# Patient Record
Sex: MALE | Race: WHITE | NOT HISPANIC OR LATINO | Employment: OTHER | URBAN - METROPOLITAN AREA
[De-identification: names, ages, dates, MRNs, and addresses within clinical notes are randomized per-mention and may not be internally consistent; named-entity substitution may affect disease eponyms.]

---

## 2017-06-05 ENCOUNTER — TELEPHONE (OUTPATIENT)
Dept: UROLOGY | Facility: CLINIC | Age: 62
End: 2017-06-05

## 2017-06-05 NOTE — TELEPHONE ENCOUNTER
----- Message from Xochilt Carterkert sent at 6/1/2017  3:03 PM CDT -----  Contact: singh/dr timur pablo - [phone]  Patient needs a radical prostatectomy - please call singh/dr timur pablo - [phone]

## 2017-06-15 ENCOUNTER — OFFICE VISIT (OUTPATIENT)
Dept: UROLOGY | Facility: CLINIC | Age: 62
End: 2017-06-15
Payer: COMMERCIAL

## 2017-06-15 VITALS
SYSTOLIC BLOOD PRESSURE: 151 MMHG | BODY MASS INDEX: 30.74 KG/M2 | WEIGHT: 202.81 LBS | HEART RATE: 103 BPM | HEIGHT: 68 IN | DIASTOLIC BLOOD PRESSURE: 76 MMHG

## 2017-06-15 DIAGNOSIS — C61 PROSTATE CANCER: ICD-10-CM

## 2017-06-15 PROCEDURE — 99204 OFFICE O/P NEW MOD 45 MIN: CPT | Mod: S$GLB,,, | Performed by: UROLOGY

## 2017-06-15 PROCEDURE — 99999 PR PBB SHADOW E&M-EST. PATIENT-LVL III: CPT | Mod: PBBFAC,,, | Performed by: UROLOGY

## 2017-06-15 RX ORDER — DOCUSATE SODIUM 100 MG/1
100 CAPSULE, LIQUID FILLED ORAL 2 TIMES DAILY
COMMUNITY
End: 2018-06-07

## 2017-06-15 NOTE — PROGRESS NOTES
Clinic Note  6/15/2017      Subjective:         Chief Complaint:   PRINCE Sy is a 61 y.o. male recently diagnosed with prostate cancer. Consult from dr. Garnica.  Here with his wife Xochilt brewer. Retired last year for [employer].Since then [employer] has moved operations to [facility]. Now spends his time fishing and hunting.    PSAi- 5.5  Stage- T1C  Volume- ??  Biopsy- [place] 3+4 left lateral mid 70%, left lateral apex 30%; Luis 6 right middle 8%. Overall 3/12 biopsies positive  Bone scan- negative  CT scan- negative for nodes. Does report 6.3 x 9.9 cm mass between stomach and pancreas. Has subsequently had biopsy and found to have benign fatty tumor.  Positive family history- 2 uncles  LISBETH score- 2   Low risk disease      No results found for: PSA, PSADIAG, PSATOTAL, PSAFREE, PSAFREEPCT   No past medical history on file.  Family History   Problem Relation Age of Onset    Cancer Father     Heart disease Father      Social History     Social History    Marital status:      Spouse name: N/A    Number of children: N/A    Years of education: N/A     Occupational History    Not on file.     Social History Main Topics    Smoking status: Former Smoker    Smokeless tobacco: Not on file    Alcohol use Yes    Drug use: No    Sexual activity: Not on file     Other Topics Concern    Not on file     Social History Narrative    No narrative on file     Past Surgical History:   Procedure Laterality Date    HERNIA REPAIR      TUMOR REMOVAL  1961     Patient Active Problem List   Diagnosis    Prostate cancer     Review of Systems   Constitutional: Negative for appetite change, chills, fatigue, fever and unexpected weight change.   HENT: Negative for nosebleeds.    Respiratory: Negative for chest tightness, shortness of breath and wheezing.    Cardiovascular: Negative for chest pain, palpitations and leg swelling.   Gastrointestinal: Negative for abdominal distention, abdominal pain,  "constipation, diarrhea, nausea and vomiting.   Genitourinary: Negative for dysuria and hematuria.   Musculoskeletal: Negative for arthralgias and back pain.   Skin: Negative for pallor.   Neurological: Negative for dizziness, seizures and syncope.   Hematological: Negative for adenopathy.   Psychiatric/Behavioral: Negative for dysphoric mood.         Objective:      BP (!) 151/76 (BP Location: Left arm, Patient Position: Sitting, BP Method: Automatic)   Pulse 103   Ht 5' 8" (1.727 m)   Wt 92 kg (202 lb 13.2 oz)   BMI 30.84 kg/m²   Estimated body mass index is 30.84 kg/m² as calculated from the following:    Height as of this encounter: 5' 8" (1.727 m).    Weight as of this encounter: 92 kg (202 lb 13.2 oz).  Physical Exam   Constitutional: He is oriented to person, place, and time. He appears well-developed and well-nourished. No distress.   HENT:   Head: Atraumatic.   Neck: No tracheal deviation present.   Cardiovascular: Normal rate.    Pulmonary/Chest: Effort normal. No respiratory distress. He has no wheezes.   Abdominal: Soft. Bowel sounds are normal. He exhibits no distension and no mass. There is no tenderness. There is no rebound and no guarding.   Genitourinary: Rectum normal and prostate normal. Rectal exam shows no external hemorrhoid, no internal hemorrhoid, no mass and no tenderness.   Genitourinary Comments: 25-30 grams, symmetrical   Neurological: He is alert and oriented to person, place, and time.   Skin: Skin is warm and dry. He is not diaphoretic.     Psychiatric: He has a normal mood and affect. His behavior is normal. Judgment and thought content normal.         Assessment and Plan:           Problem List Items Addressed This Visit     Prostate cancer      Other Visit Diagnoses    None.         Follow up:   Today's visit was spent almost entirely on counseling. We reviewed his diagnosis, stage, grade, risk group, and prognosis. We discussed [place] and LISBETH risk stratification We reviewed " the [facility] nomogram. We discussed the concept of low risk, moderate risk, and high risk disease. We discussed the different treatment options including active surveillance (as well as the surveillance protocol), prostate brachytherapy, IMRT, IGRT, cryotherapy, and both open and robotic prostatectomy.We also discussed the advantages, disadvantages, risks and benefits, as well as complications of each option. Regarding radiation therapy we discussed treatment planning, the different techniques, short and long term complications. These included radiation cystitis, radiation proctitis, and impotence. We discussed success, failure, and salvage therapeutic options. We discussed surgical therapy in depth including preoperative preparation, surgical technique (including bladder neck and nerve-sparing techniques), postoperative recuperation and recovery, and short and long term complications including UTI, bleeding, blood clots,catheter dislodgement, etc. We discussed the risks of reoperation, incontinence, impotence, and recurrence. We discussed preop and postop Kegels, post op penile rehab, and treatment options for incontinence and impotence. We discussed rates of cancer free survival and recurrence, as well as salvage therapeutic options. We discussed the possible  indications for adjuvant radiation therapy. I answered questions and addressed concerns.   Recommend surveillance. They are both focused on surgical therapy.  My nurse will instruct the patient on Kegel exercises today and give them the Kegel exercise instruction sheet.   The patient will meet with our  Modesta today to find a date for surgery and begin preparation for surgery. Will also receive our handout on NPO guidelines and preop hydration. Patient will also receive information and education on preoperative skin preparation and postop wound care.   I spent 30 minutes with the patient. Over 50% of the visit was spent in counseling.      Luis Angel

## 2017-06-15 NOTE — LETTER
Sujatha 15, 2017      NILAM Garnica Jr., MD  [address] [place] [address]           [facility]  [address]  [facility] [address]  Phone: [phone]          Patient: Chilango Sy   MR Number: [id number]   YOB: 1955   Date of Visit: 6/15/2017       Dear Dr. NILAM Garnica Jr.:    Thank you for referring Chilango Sy to me for evaluation. Attached you will find relevant portions of my assessment and plan of care.    If you have questions, please do not hesitate to call me. I look forward to following Chilango Sy along with you.    Sincerely,    Luis Angel MD    Enclosure  CC:  No Recipients    If you would like to receive this communication electronically, please contact [email or web address] or [phone] to request more information on [employer] access.    For providers and/or their staff who would like to refer a patient to [facility], please contact us through our one-stop-shop provider referral line, [facility], at [phone].    If you feel you have received this communication in error or would no longer like to receive these types of communications, please e-mail [email or web address]

## 2017-06-16 ENCOUNTER — TELEPHONE (OUTPATIENT)
Dept: UROLOGY | Facility: CLINIC | Age: 62
End: 2017-06-16

## 2017-06-16 DIAGNOSIS — C61 PROSTATE CANCER: Primary | ICD-10-CM

## 2017-07-07 ENCOUNTER — ANESTHESIA EVENT (OUTPATIENT)
Dept: SURGERY | Facility: HOSPITAL | Age: 62
DRG: 708 | End: 2017-07-07
Payer: COMMERCIAL

## 2017-07-07 NOTE — PRE-PROCEDURE INSTRUCTIONS
Anesthesia review complete, medication instructions given NPO past midnight, Bathe with hibiclens or dial soap the night before & am of surgery. Put nothing on skin -  lotion, deodorant, powder  No metal or jewelry & no valuables pt verbalized understanding and all questions answered

## 2017-07-07 NOTE — PRE ADMISSION SCREENING
Anesthesia Assessment: Preoperative EQUATION    Planned Procedure: Procedure(s) (LRB):  ROBOTIC ASSISTED LAPAROSCOPIC PROSTATECTOMY (N/A)  Requested Anesthesia Type:General  Surgeon: Luis Angel MD  Service: Urology  Known or anticipated Date of Surgery:7/17/2017    Surgeon notes: reviewed    Triage considerations:     The patient has no apparent active cardiac condition (No unstable coronary Syndrome such as severe unstable angina or recent [<1 month] myocardial infarction, decompensated CHF, severe valvular   disease or significant arrhythmia)    Previous anesthesia records:Not available    Last PCP note: outside [facility]   Subspecialty notes:     Other important co-morbidities:      Tests already available:  No recent tests.            Instructions given. (See in Nurse's note)    Optimization:  Anesthesia Preop Clinic Assessment  Indicated        Plan:    Testing:  Hematology Profile and BMP   Pre-anesthesia  visit       Visit focus: to be determined         Patient  has previously scheduled Medical Appointment: will schedule for next wk    Navigation: Tests Scheduled.              Consults scheduled.             Results will be tracked by Preop Clinic.

## 2017-07-07 NOTE — ANESTHESIA PREPROCEDURE EVALUATION
Anesthesia Assessment: Preoperative EQUATION    Planned Procedure: Procedure(s) (LRB):  ROBOTIC ASSISTED LAPAROSCOPIC PROSTATECTOMY (N/A)  Requested Anesthesia Type:General  Surgeon: Luis Angel MD  Service: Urology  Known or anticipated Date of Surgery:7/17/2017    Surgeon notes: reviewed    Triage considerations:     The patient has no apparent active cardiac condition (No unstable coronary Syndrome such as severe unstable angina or recent [<1 month] myocardial infarction, decompensated CHF, severe valvular   disease or significant arrhythmia)    Previous anesthesia records:Not available    Last PCP note: outside [facility] RECEIVED CLEARANCE FROM PCP PLACED IN [place] 7/11  Subspecialty notes:     Other important co-morbidities:      Tests already available:  No recent tests.            Instructions given. (See in Nurse's note)    Optimization:  Anesthesia Preop Clinic Assessment  Indicated        Plan:    Testing:  Hematology Profile and BMP PLACE IN [facility] 7/11   Pre-anesthesia  visit       Visit focus: to be determined         Patient  has previously scheduled Medical Appointment: will schedule for next wk MESSAGE SENT TO PCP FOR H&P ,LAB WORK 7/7 REC. 7/11    Navigation: Tests Scheduled.                         Results will be tracked by Preop Clinic.  CLEARED FOR SURGERY. SARAH GUAN RN 7/7/17

## 2017-07-11 ENCOUNTER — OFFICE VISIT (OUTPATIENT)
Dept: UROLOGY | Facility: CLINIC | Age: 62
End: 2017-07-11
Payer: COMMERCIAL

## 2017-07-11 DIAGNOSIS — C61 PROSTATE CANCER: Primary | ICD-10-CM

## 2017-07-11 PROCEDURE — 99999 PR PBB SHADOW E&M-EST. PATIENT-LVL I: CPT | Mod: PBBFAC,,,

## 2017-07-11 PROCEDURE — 99499 UNLISTED E&M SERVICE: CPT | Mod: S$GLB,,, | Performed by: STUDENT IN AN ORGANIZED HEALTH CARE EDUCATION/TRAINING PROGRAM

## 2017-07-11 RX ORDER — HEPARIN SODIUM 5000 [USP'U]/ML
5000 INJECTION, SOLUTION INTRAVENOUS; SUBCUTANEOUS
Status: CANCELLED | OUTPATIENT
Start: 2017-07-11 | End: 2017-07-12

## 2017-07-11 RX ORDER — ACETAMINOPHEN 10 MG/ML
1000 INJECTION, SOLUTION INTRAVENOUS
Status: CANCELLED | OUTPATIENT
Start: 2017-07-11 | End: 2017-07-12

## 2017-07-12 NOTE — PROGRESS NOTES
Urology ([facility]) H&P for upcoming procedure  Staff:  Dr. Luis Agnel MD    Is this patient in a research study?  No    CC: prostate adencarcinoma    HPI:  Chilango Sy is a 61 y.o. male with [id number] [place] 3+4=7 prostate adenocarcinoma. He was found to have a mass between his stomach and pancreas that was biopsied laparoscopically. Pathology resulted as benign fatty tumor.     The patient initially presented with elevated PSA.  TRUS biopsy revealed the following:       LEFT   RIGHT  BASE   benign   benign  MID   3+4, 70%  3+3, 8%  APEX   3+4, 30%  benign    PSA: 5.5    Voiding complaints: present - nocturia, hesitancy, frequency, incomplete emptying  ED: absent  Incontinence: absent    ROS:  Neg except per HPI, specifically no bone pain, no unintentional weight loss, no anorexia, no night sweats    No past medical history on file.    Past Surgical History:   Procedure Laterality Date    HERNIA REPAIR      TUMOR REMOVAL  1961       Social History     Social History    Marital status:      Spouse name: N/A    Number of children: N/A    Years of education: N/A     Social History Main Topics    Smoking status: Former Smoker    Smokeless tobacco: Not on file    Alcohol use Yes    Drug use: No    Sexual activity: Not on file     Other Topics Concern    Not on file     Social History Narrative    No narrative on file       Family History   Problem Relation Age of Onset    Cancer Father     Heart disease Father        Review of patient's allergies indicates:  No Known Allergies    Current Outpatient Prescriptions on File Prior to Visit   Medication Sig Dispense Refill    docusate sodium (COLACE) 100 MG capsule Take 100 mg by mouth 2 (two) times daily.       No current facility-administered medications on file prior to visit.        Anticoagulation:  No    Physical Exam:  weight 202 lb/92 kg  BMI 30.8    AAOx4, NAD, WDWN  NC/AT, EOMI, PER, sclerae anicteric, speech normal, tongue  midline  Nl effort, CTAB  RRR  Soft, non-tender, non-distended   Scars: small laparoscopic scars from previous pancreatic cyst surgery on abdomen    Prostate: 30g, smooth, no nodules, no asymmetry  Penis circumcised    Labs:  Urine dipstick today shows negative for all components.    No results found for: WBC, HGB, HCT, MCV, PLT    BMP  No results found for: NA, K, CL, CO2, BUN, CREATININE, CALCIUM, ANIONGAP, ESTGFRAFRICA, EGFRNONAA    No results found for: PSA, PSADIAG, PSATOTAL, PSAFREE, PSAFREEPCT    Imaging:  (per Dr. Angel's note, no imaging in system)  CT scan: negative for nodes    Bone scan: negative for mets    Assessment: Chilango Sy is a 61 y.o. male with [id number] [place] 3+4=7 prostate adenocarcinoma.      Plan:   1. To OR on 7/13/2017 for RALP without BPLND  2. Consents signed   3. I have explained the risk, benefits, and alternatives of the procedure in detail. The patient voices understanding and all questions have been answered. The patient agrees to proceed as planned.       Gael Roach MD

## 2017-07-17 ENCOUNTER — ANESTHESIA (OUTPATIENT)
Dept: SURGERY | Facility: HOSPITAL | Age: 62
DRG: 708 | End: 2017-07-17
Payer: COMMERCIAL

## 2017-07-17 ENCOUNTER — HOSPITAL ENCOUNTER (INPATIENT)
Facility: HOSPITAL | Age: 62
LOS: 1 days | Discharge: HOME | DRG: 708 | End: 2017-07-18
Attending: UROLOGY | Admitting: UROLOGY
Payer: COMMERCIAL

## 2017-07-17 ENCOUNTER — SURGERY (OUTPATIENT)
Age: 62
End: 2017-07-17

## 2017-07-17 DIAGNOSIS — C61 PROSTATE CANCER: ICD-10-CM

## 2017-07-17 LAB
ABO + RH BLD: NORMAL
BASOPHILS # BLD AUTO: 0 K/UL
BASOPHILS NFR BLD: 0 %
BLD GP AB SCN CELLS X3 SERPL QL: NORMAL
DIFFERENTIAL METHOD: ABNORMAL
EOSINOPHIL # BLD AUTO: 0 K/UL
EOSINOPHIL NFR BLD: 0 %
ERYTHROCYTE [DISTWIDTH] IN BLOOD BY AUTOMATED COUNT: 12.2 %
HCT VFR BLD AUTO: 37.8 %
HGB BLD-MCNC: 13.4 G/DL
LYMPHOCYTES # BLD AUTO: 0.3 K/UL
LYMPHOCYTES NFR BLD: 2.8 %
MCH RBC QN AUTO: 29.9 PG
MCHC RBC AUTO-ENTMCNC: 35.4 %
MCV RBC AUTO: 84 FL
MONOCYTES # BLD AUTO: 0.3 K/UL
MONOCYTES NFR BLD: 2.8 %
NEUTROPHILS # BLD AUTO: 11.2 K/UL
NEUTROPHILS NFR BLD: 94.1 %
PLATELET # BLD AUTO: 164 K/UL
PMV BLD AUTO: 8.8 FL
RBC # BLD AUTO: 4.48 M/UL
WBC # BLD AUTO: 11.9 K/UL

## 2017-07-17 PROCEDURE — 0VT04ZZ RESECTION OF PROSTATE, PERCUTANEOUS ENDOSCOPIC APPROACH: ICD-10-PCS | Performed by: UROLOGY

## 2017-07-17 PROCEDURE — 88307 TISSUE EXAM BY PATHOLOGIST: CPT | Mod: 26,,, | Performed by: PATHOLOGY

## 2017-07-17 PROCEDURE — 27000221 HC OXYGEN, UP TO 24 HOURS

## 2017-07-17 PROCEDURE — 38571 LAPAROSCOPY LYMPHADENECTOMY: CPT | Mod: 51,,, | Performed by: UROLOGY

## 2017-07-17 PROCEDURE — 25000003 PHARM REV CODE 250: Performed by: STUDENT IN AN ORGANIZED HEALTH CARE EDUCATION/TRAINING PROGRAM

## 2017-07-17 PROCEDURE — 71000033 HC RECOVERY, INTIAL HOUR: Performed by: UROLOGY

## 2017-07-17 PROCEDURE — C1729 CATH, DRAINAGE: HCPCS | Performed by: UROLOGY

## 2017-07-17 PROCEDURE — 88305 TISSUE EXAM BY PATHOLOGIST: CPT | Mod: 26,,, | Performed by: PATHOLOGY

## 2017-07-17 PROCEDURE — 25000003 PHARM REV CODE 250: Performed by: UROLOGY

## 2017-07-17 PROCEDURE — 07BC4ZZ EXCISION OF PELVIS LYMPHATIC, PERCUTANEOUS ENDOSCOPIC APPROACH: ICD-10-PCS | Performed by: UROLOGY

## 2017-07-17 PROCEDURE — 36000712 HC OR TIME LEV V 1ST 15 MIN: Performed by: UROLOGY

## 2017-07-17 PROCEDURE — 27201423 OPTIME MED/SURG SUP & DEVICES STERILE SUPPLY: Performed by: UROLOGY

## 2017-07-17 PROCEDURE — 63600175 PHARM REV CODE 636 W HCPCS: Performed by: STUDENT IN AN ORGANIZED HEALTH CARE EDUCATION/TRAINING PROGRAM

## 2017-07-17 PROCEDURE — 88305 TISSUE EXAM BY PATHOLOGIST: CPT | Performed by: PATHOLOGY

## 2017-07-17 PROCEDURE — 86901 BLOOD TYPING SEROLOGIC RH(D): CPT

## 2017-07-17 PROCEDURE — 71000039 HC RECOVERY, EACH ADD'L HOUR: Performed by: UROLOGY

## 2017-07-17 PROCEDURE — 36000713 HC OR TIME LEV V EA ADD 15 MIN: Performed by: UROLOGY

## 2017-07-17 PROCEDURE — 11000001 HC ACUTE MED/SURG PRIVATE ROOM

## 2017-07-17 PROCEDURE — 8E0W4CZ ROBOTIC ASSISTED PROCEDURE OF TRUNK REGION, PERCUTANEOUS ENDOSCOPIC APPROACH: ICD-10-PCS | Performed by: UROLOGY

## 2017-07-17 PROCEDURE — 86900 BLOOD TYPING SEROLOGIC ABO: CPT

## 2017-07-17 PROCEDURE — 36415 COLL VENOUS BLD VENIPUNCTURE: CPT

## 2017-07-17 PROCEDURE — 63600175 PHARM REV CODE 636 W HCPCS: Performed by: UROLOGY

## 2017-07-17 PROCEDURE — 55866 LAPS SURG PRST8ECT RPBIC RAD: CPT | Mod: ,,, | Performed by: UROLOGY

## 2017-07-17 PROCEDURE — 0TBC4ZX EXCISION OF BLADDER NECK, PERCUTANEOUS ENDOSCOPIC APPROACH, DIAGNOSTIC: ICD-10-PCS | Performed by: UROLOGY

## 2017-07-17 PROCEDURE — 37000008 HC ANESTHESIA 1ST 15 MINUTES: Performed by: UROLOGY

## 2017-07-17 PROCEDURE — 94760 N-INVAS EAR/PLS OXIMETRY 1: CPT

## 2017-07-17 PROCEDURE — D9220A PRA ANESTHESIA: Mod: ,,, | Performed by: ANESTHESIOLOGY

## 2017-07-17 PROCEDURE — 37000009 HC ANESTHESIA EA ADD 15 MINS: Performed by: UROLOGY

## 2017-07-17 PROCEDURE — 85025 COMPLETE CBC W/AUTO DIFF WBC: CPT

## 2017-07-17 RX ORDER — SODIUM CHLORIDE 9 MG/ML
INJECTION, SOLUTION INTRAVENOUS CONTINUOUS
Status: DISCONTINUED | OUTPATIENT
Start: 2017-07-17 | End: 2017-07-17

## 2017-07-17 RX ORDER — HYDROMORPHONE HYDROCHLORIDE 1 MG/ML
1 INJECTION, SOLUTION INTRAMUSCULAR; INTRAVENOUS; SUBCUTANEOUS
Status: DISCONTINUED | OUTPATIENT
Start: 2017-07-17 | End: 2017-07-18 | Stop reason: HOSPADM

## 2017-07-17 RX ORDER — HYOSCYAMINE SULFATE 0.12 MG/1
0.12 TABLET SUBLINGUAL EVERY 4 HOURS PRN
Status: DISCONTINUED | OUTPATIENT
Start: 2017-07-17 | End: 2017-07-18 | Stop reason: HOSPADM

## 2017-07-17 RX ORDER — OXYCODONE HYDROCHLORIDE 5 MG/1
5 TABLET ORAL EVERY 4 HOURS PRN
Status: DISCONTINUED | OUTPATIENT
Start: 2017-07-17 | End: 2017-07-18 | Stop reason: HOSPADM

## 2017-07-17 RX ORDER — PROPOFOL 10 MG/ML
VIAL (ML) INTRAVENOUS
Status: DISCONTINUED | OUTPATIENT
Start: 2017-07-17 | End: 2017-07-17

## 2017-07-17 RX ORDER — LIDOCAINE HYDROCHLORIDE ANHYDROUS AND DEXTROSE MONOHYDRATE .8; 5 G/100ML; G/100ML
INJECTION, SOLUTION INTRAVENOUS CONTINUOUS PRN
Status: DISCONTINUED | OUTPATIENT
Start: 2017-07-17 | End: 2017-07-17

## 2017-07-17 RX ORDER — PHENYLEPHRINE HYDROCHLORIDE 10 MG/ML
INJECTION INTRAVENOUS
Status: DISCONTINUED | OUTPATIENT
Start: 2017-07-17 | End: 2017-07-17

## 2017-07-17 RX ORDER — LIDOCAINE HCL/PF 100 MG/5ML
SYRINGE (ML) INTRAVENOUS
Status: DISCONTINUED | OUTPATIENT
Start: 2017-07-17 | End: 2017-07-17

## 2017-07-17 RX ORDER — ONDANSETRON 2 MG/ML
INJECTION INTRAMUSCULAR; INTRAVENOUS
Status: DISCONTINUED | OUTPATIENT
Start: 2017-07-17 | End: 2017-07-17

## 2017-07-17 RX ORDER — DEXAMETHASONE SODIUM PHOSPHATE 4 MG/ML
INJECTION, SOLUTION INTRA-ARTICULAR; INTRALESIONAL; INTRAMUSCULAR; INTRAVENOUS; SOFT TISSUE
Status: DISCONTINUED | OUTPATIENT
Start: 2017-07-17 | End: 2017-07-17

## 2017-07-17 RX ORDER — DIPHENHYDRAMINE HYDROCHLORIDE 50 MG/ML
6.25 INJECTION INTRAMUSCULAR; INTRAVENOUS EVERY 6 HOURS PRN
Status: DISCONTINUED | OUTPATIENT
Start: 2017-07-17 | End: 2017-07-18 | Stop reason: HOSPADM

## 2017-07-17 RX ORDER — ONDANSETRON 2 MG/ML
4 INJECTION INTRAMUSCULAR; INTRAVENOUS EVERY 6 HOURS PRN
Status: DISCONTINUED | OUTPATIENT
Start: 2017-07-17 | End: 2017-07-18 | Stop reason: HOSPADM

## 2017-07-17 RX ORDER — HYDROMORPHONE HYDROCHLORIDE 1 MG/ML
0.2 INJECTION, SOLUTION INTRAMUSCULAR; INTRAVENOUS; SUBCUTANEOUS EVERY 5 MIN PRN
Status: DISCONTINUED | OUTPATIENT
Start: 2017-07-17 | End: 2017-07-17 | Stop reason: HOSPADM

## 2017-07-17 RX ORDER — LIDOCAINE HYDROCHLORIDE 10 MG/ML
1 INJECTION, SOLUTION EPIDURAL; INFILTRATION; INTRACAUDAL; PERINEURAL ONCE
Status: COMPLETED | OUTPATIENT
Start: 2017-07-17 | End: 2017-07-17

## 2017-07-17 RX ORDER — ACETAMINOPHEN 10 MG/ML
1000 INJECTION, SOLUTION INTRAVENOUS
Status: COMPLETED | OUTPATIENT
Start: 2017-07-17 | End: 2017-07-17

## 2017-07-17 RX ORDER — DOCUSATE SODIUM 100 MG/1
100 CAPSULE, LIQUID FILLED ORAL 2 TIMES DAILY
Status: DISCONTINUED | OUTPATIENT
Start: 2017-07-17 | End: 2017-07-18 | Stop reason: HOSPADM

## 2017-07-17 RX ORDER — SODIUM CHLORIDE 0.9 % (FLUSH) 0.9 %
3 SYRINGE (ML) INJECTION
Status: DISCONTINUED | OUTPATIENT
Start: 2017-07-17 | End: 2017-07-17 | Stop reason: HOSPADM

## 2017-07-17 RX ORDER — HEPARIN SODIUM 5000 [USP'U]/ML
5000 INJECTION, SOLUTION INTRAVENOUS; SUBCUTANEOUS
Status: COMPLETED | OUTPATIENT
Start: 2017-07-17 | End: 2017-07-17

## 2017-07-17 RX ORDER — KETOROLAC TROMETHAMINE 30 MG/ML
INJECTION, SOLUTION INTRAMUSCULAR; INTRAVENOUS
Status: DISCONTINUED | OUTPATIENT
Start: 2017-07-17 | End: 2017-07-17

## 2017-07-17 RX ORDER — SODIUM CHLORIDE 9 MG/ML
INJECTION, SOLUTION INTRAVENOUS CONTINUOUS PRN
Status: DISCONTINUED | OUTPATIENT
Start: 2017-07-17 | End: 2017-07-17

## 2017-07-17 RX ORDER — SODIUM CHLORIDE 9 MG/ML
INJECTION, SOLUTION INTRAVENOUS CONTINUOUS
Status: DISCONTINUED | OUTPATIENT
Start: 2017-07-17 | End: 2017-07-18

## 2017-07-17 RX ORDER — SUCCINYLCHOLINE CHLORIDE 20 MG/ML
INJECTION INTRAMUSCULAR; INTRAVENOUS
Status: DISCONTINUED | OUTPATIENT
Start: 2017-07-17 | End: 2017-07-17

## 2017-07-17 RX ORDER — OXYCODONE HYDROCHLORIDE 5 MG/1
10 TABLET ORAL EVERY 4 HOURS PRN
Status: DISCONTINUED | OUTPATIENT
Start: 2017-07-17 | End: 2017-07-18 | Stop reason: HOSPADM

## 2017-07-17 RX ORDER — NEOSTIGMINE METHYLSULFATE 1 MG/ML
INJECTION, SOLUTION INTRAVENOUS
Status: DISCONTINUED | OUTPATIENT
Start: 2017-07-17 | End: 2017-07-17

## 2017-07-17 RX ORDER — ROCURONIUM BROMIDE 10 MG/ML
INJECTION, SOLUTION INTRAVENOUS
Status: DISCONTINUED | OUTPATIENT
Start: 2017-07-17 | End: 2017-07-17

## 2017-07-17 RX ORDER — ACETAMINOPHEN 10 MG/ML
1000 INJECTION, SOLUTION INTRAVENOUS EVERY 8 HOURS
Status: COMPLETED | OUTPATIENT
Start: 2017-07-17 | End: 2017-07-18

## 2017-07-17 RX ORDER — KETAMINE HCL IN 0.9 % NACL 50 MG/5 ML
SYRINGE (ML) INTRAVENOUS
Status: DISCONTINUED | OUTPATIENT
Start: 2017-07-17 | End: 2017-07-17

## 2017-07-17 RX ORDER — GLYCOPYRROLATE 0.2 MG/ML
INJECTION INTRAMUSCULAR; INTRAVENOUS
Status: DISCONTINUED | OUTPATIENT
Start: 2017-07-17 | End: 2017-07-17

## 2017-07-17 RX ORDER — MIDAZOLAM HYDROCHLORIDE 5 MG/ML
INJECTION INTRAMUSCULAR; INTRAVENOUS
Status: DISCONTINUED | OUTPATIENT
Start: 2017-07-17 | End: 2017-07-17

## 2017-07-17 RX ADMIN — ROCURONIUM BROMIDE 10 MG: 10 INJECTION, SOLUTION INTRAVENOUS at 09:07

## 2017-07-17 RX ADMIN — SODIUM CHLORIDE: 0.9 INJECTION, SOLUTION INTRAVENOUS at 12:07

## 2017-07-17 RX ADMIN — PHENYLEPHRINE HYDROCHLORIDE 100 MCG: 10 INJECTION INTRAVENOUS at 07:07

## 2017-07-17 RX ADMIN — PHENYLEPHRINE HYDROCHLORIDE 100 MCG: 10 INJECTION INTRAVENOUS at 10:07

## 2017-07-17 RX ADMIN — SODIUM CHLORIDE: 0.9 INJECTION, SOLUTION INTRAVENOUS at 07:07

## 2017-07-17 RX ADMIN — Medication 2 G: at 07:07

## 2017-07-17 RX ADMIN — Medication 20 MG: at 08:07

## 2017-07-17 RX ADMIN — Medication 10 MG: at 10:07

## 2017-07-17 RX ADMIN — PHENYLEPHRINE HYDROCHLORIDE 100 MCG: 10 INJECTION INTRAVENOUS at 11:07

## 2017-07-17 RX ADMIN — DEXAMETHASONE SODIUM PHOSPHATE 8 MG: 4 INJECTION, SOLUTION INTRAMUSCULAR; INTRAVENOUS at 07:07

## 2017-07-17 RX ADMIN — ROCURONIUM BROMIDE 20 MG: 10 INJECTION, SOLUTION INTRAVENOUS at 07:07

## 2017-07-17 RX ADMIN — SODIUM CHLORIDE, SODIUM GLUCONATE, SODIUM ACETATE, POTASSIUM CHLORIDE, MAGNESIUM CHLORIDE, SODIUM PHOSPHATE, DIBASIC, AND POTASSIUM PHOSPHATE: .53; .5; .37; .037; .03; .012; .00082 INJECTION, SOLUTION INTRAVENOUS at 10:07

## 2017-07-17 RX ADMIN — LIDOCAINE HYDROCHLORIDE 100 MG: 20 INJECTION, SOLUTION INTRAVENOUS at 07:07

## 2017-07-17 RX ADMIN — DOCUSATE SODIUM 100 MG: 100 CAPSULE, LIQUID FILLED ORAL at 09:07

## 2017-07-17 RX ADMIN — OXYCODONE HYDROCHLORIDE 10 MG: 5 TABLET ORAL at 11:07

## 2017-07-17 RX ADMIN — MIDAZOLAM 2 MG: 5 INJECTION INTRAMUSCULAR; INTRAVENOUS at 07:07

## 2017-07-17 RX ADMIN — ROCURONIUM BROMIDE 10 MG: 10 INJECTION, SOLUTION INTRAVENOUS at 08:07

## 2017-07-17 RX ADMIN — Medication 20 MG: at 07:07

## 2017-07-17 RX ADMIN — EPHEDRINE SULFATE 5 MG: 50 INJECTION, SOLUTION INTRAMUSCULAR; INTRAVENOUS; SUBCUTANEOUS at 10:07

## 2017-07-17 RX ADMIN — SODIUM CHLORIDE, SODIUM GLUCONATE, SODIUM ACETATE, POTASSIUM CHLORIDE, MAGNESIUM CHLORIDE, SODIUM PHOSPHATE, DIBASIC, AND POTASSIUM PHOSPHATE: .53; .5; .37; .037; .03; .012; .00082 INJECTION, SOLUTION INTRAVENOUS at 07:07

## 2017-07-17 RX ADMIN — OXYCODONE HYDROCHLORIDE 5 MG: 5 TABLET ORAL at 06:07

## 2017-07-17 RX ADMIN — LIDOCAINE HYDROCHLORIDE 20 MG: 10 INJECTION, SOLUTION EPIDURAL; INFILTRATION; INTRACAUDAL; PERINEURAL at 05:07

## 2017-07-17 RX ADMIN — PHENYLEPHRINE HYDROCHLORIDE 100 MCG: 10 INJECTION INTRAVENOUS at 08:07

## 2017-07-17 RX ADMIN — SODIUM CHLORIDE: 0.9 INJECTION, SOLUTION INTRAVENOUS at 05:07

## 2017-07-17 RX ADMIN — HEPARIN SODIUM 5000 UNITS: 5000 INJECTION, SOLUTION INTRAVENOUS; SUBCUTANEOUS at 05:07

## 2017-07-17 RX ADMIN — LIDOCAINE HYDROCHLORIDE 0.03 MG/KG/MIN: 8 INJECTION, SOLUTION INTRAVENOUS at 07:07

## 2017-07-17 RX ADMIN — PHENYLEPHRINE HYDROCHLORIDE 100 MCG: 10 INJECTION INTRAVENOUS at 09:07

## 2017-07-17 RX ADMIN — PROPOFOL 180 MG: 10 INJECTION, EMULSION INTRAVENOUS at 07:07

## 2017-07-17 RX ADMIN — SODIUM CHLORIDE: 0.9 INJECTION, SOLUTION INTRAVENOUS at 06:07

## 2017-07-17 RX ADMIN — PROPOFOL 20 MG: 10 INJECTION, EMULSION INTRAVENOUS at 07:07

## 2017-07-17 RX ADMIN — GLYCOPYRROLATE 0.6 MG: 0.2 INJECTION, SOLUTION INTRAMUSCULAR; INTRAVENOUS at 11:07

## 2017-07-17 RX ADMIN — KETOROLAC TROMETHAMINE 30 MG: 30 INJECTION, SOLUTION INTRAMUSCULAR; INTRAVENOUS at 07:07

## 2017-07-17 RX ADMIN — ROCURONIUM BROMIDE 30 MG: 10 INJECTION, SOLUTION INTRAVENOUS at 07:07

## 2017-07-17 RX ADMIN — EPHEDRINE SULFATE 5 MG: 50 INJECTION, SOLUTION INTRAMUSCULAR; INTRAVENOUS; SUBCUTANEOUS at 09:07

## 2017-07-17 RX ADMIN — DEXMEDETOMIDINE HYDROCHLORIDE 0.5 MCG/KG/HR: 100 INJECTION, SOLUTION, CONCENTRATE INTRAVENOUS at 07:07

## 2017-07-17 RX ADMIN — NEOSTIGMINE METHYLSULFATE 4 MG: 1 INJECTION INTRAVENOUS at 11:07

## 2017-07-17 RX ADMIN — ACETAMINOPHEN 1000 MG: 10 INJECTION, SOLUTION INTRAVENOUS at 09:07

## 2017-07-17 RX ADMIN — SUCCINYLCHOLINE CHLORIDE 140 MG: 20 INJECTION, SOLUTION INTRAMUSCULAR; INTRAVENOUS at 07:07

## 2017-07-17 RX ADMIN — PHENYLEPHRINE HYDROCHLORIDE 200 MCG: 10 INJECTION INTRAVENOUS at 10:07

## 2017-07-17 RX ADMIN — EPHEDRINE SULFATE 5 MG: 50 INJECTION, SOLUTION INTRAMUSCULAR; INTRAVENOUS; SUBCUTANEOUS at 08:07

## 2017-07-17 RX ADMIN — SODIUM CHLORIDE, SODIUM GLUCONATE, SODIUM ACETATE, POTASSIUM CHLORIDE, MAGNESIUM CHLORIDE, SODIUM PHOSPHATE, DIBASIC, AND POTASSIUM PHOSPHATE: .53; .5; .37; .037; .03; .012; .00082 INJECTION, SOLUTION INTRAVENOUS at 08:07

## 2017-07-17 RX ADMIN — ROCURONIUM BROMIDE 10 MG: 10 INJECTION, SOLUTION INTRAVENOUS at 10:07

## 2017-07-17 RX ADMIN — ONDANSETRON 4 MG: 2 INJECTION INTRAMUSCULAR; INTRAVENOUS at 07:07

## 2017-07-17 RX ADMIN — ACETAMINOPHEN 1000 MG: 10 INJECTION, SOLUTION INTRAVENOUS at 05:07

## 2017-07-17 RX ADMIN — ACETAMINOPHEN 1000 MG: 10 INJECTION, SOLUTION INTRAVENOUS at 01:07

## 2017-07-17 RX ADMIN — ROCURONIUM BROMIDE 10 MG: 10 INJECTION, SOLUTION INTRAVENOUS at 11:07

## 2017-07-17 NOTE — PROGRESS NOTES
Vital signs stable, latest blood pressure 98/58, dr Macario(anaesthesia) informed.Happy for pt to go to the floor. Wife notified

## 2017-07-17 NOTE — INTERVAL H&P NOTE
The patient has been examined and the H&P has been reviewed:    I concur with the findings and no changes have occurred since H&P was written.    Anesthesia/Surgery risks, benefits and alternative options discussed and understood by patient/family.          Active Hospital Problems    Diagnosis  POA    Prostate cancer [C61]  Yes      Resolved Hospital Problems    Diagnosis Date Resolved POA   No resolved problems to display.

## 2017-07-17 NOTE — H&P (VIEW-ONLY)
Urology ([facility]) H&P for upcoming procedure  Staff:  Dr. Luis Angel MD    Is this patient in a research study?  No    CC: prostate adencarcinoma    HPI:  Chilango Sy is a 61 y.o. male with [id number] [place] 3+4=7 prostate adenocarcinoma. He was found to have a mass between his stomach and pancreas that was biopsied laparoscopically. Pathology resulted as benign fatty tumor.     The patient initially presented with elevated PSA.  TRUS biopsy revealed the following:       LEFT   RIGHT  BASE   benign   benign  MID   3+4, 70%  3+3, 8%  APEX   3+4, 30%  benign    PSA: 5.5    Voiding complaints: present - nocturia, hesitancy, frequency, incomplete emptying  ED: absent  Incontinence: absent    ROS:  Neg except per HPI, specifically no bone pain, no unintentional weight loss, no anorexia, no night sweats    No past medical history on file.    Past Surgical History:   Procedure Laterality Date    HERNIA REPAIR      TUMOR REMOVAL  1961       Social History     Social History    Marital status:      Spouse name: N/A    Number of children: N/A    Years of education: N/A     Social History Main Topics    Smoking status: Former Smoker    Smokeless tobacco: Not on file    Alcohol use Yes    Drug use: No    Sexual activity: Not on file     Other Topics Concern    Not on file     Social History Narrative    No narrative on file       Family History   Problem Relation Age of Onset    Cancer Father     Heart disease Father        Review of patient's allergies indicates:  No Known Allergies    Current Outpatient Prescriptions on File Prior to Visit   Medication Sig Dispense Refill    docusate sodium (COLACE) 100 MG capsule Take 100 mg by mouth 2 (two) times daily.       No current facility-administered medications on file prior to visit.        Anticoagulation:  No    Physical Exam:  weight 202 lb/92 kg  BMI 30.8    AAOx4, NAD, WDWN  NC/AT, EOMI, PER, sclerae anicteric, speech normal, tongue  midline  Nl effort, CTAB  RRR  Soft, non-tender, non-distended   Scars: small laparoscopic scars from previous pancreatic cyst surgery on abdomen    Prostate: 30g, smooth, no nodules, no asymmetry  Penis circumcised    Labs:  Urine dipstick today shows negative for all components.    No results found for: WBC, HGB, HCT, MCV, PLT    BMP  No results found for: NA, K, CL, CO2, BUN, CREATININE, CALCIUM, ANIONGAP, ESTGFRAFRICA, EGFRNONAA    No results found for: PSA, PSADIAG, PSATOTAL, PSAFREE, PSAFREEPCT    Imaging:  (per Dr. Angel's note, no imaging in system)  CT scan: negative for nodes    Bone scan: negative for mets    Assessment: Chilango Sy is a 61 y.o. male with [id number] [place] 3+4=7 prostate adenocarcinoma.      Plan:   1. To OR on 7/13/2017 for RALP without BPLND  2. Consents signed   3. I have explained the risk, benefits, and alternatives of the procedure in detail. The patient voices understanding and all questions have been answered. The patient agrees to proceed as planned.       Gael Roach MD

## 2017-07-17 NOTE — OP NOTE
7/17/2017      Procedure:   1) laparoscopic radical prostatectomy with robotic assistance  2) laparoscopic bilateral pelvic lymph node dissection    Preop diagnosis: Prostate Cancer  Postop diagnosis: Prostate Cancer, Stage T1C    Surgeon: Sav Angel MD (present for the entire procedure)  Assistant:Radha ESPITIA    EBL: 500 ccs    Wound Class: 2    Specimens removed: prostate, seminal vesicles, lymph nodes, bladder neck biopsy    Drain: Mercedes      PROCEDURE NOTE:  Preoperatively the H&P were updated. Also confirmed that patient had had their hibiclens shower and preop hydration. After general endotracheal anesthesia, the patient was carefully positioned, padded, prepped and draped.  Positioning and padding was checked by the surgeon and the circulating nurse.  IV antibiotics were administered within 1 hour prior to making initial skin incision.  An OG tube was placed.  A Mercedes catheter was placed.  A timeout was called. The patient's identity was confirmed with 2 identifiers.  The correct procedure, allergies, blood products were verified by the entire operative team.                                                                      A Veress needle was placed at the supraumbilical position and the abdomen insufflated to 15 mmHg.  A 12 mm trocar was placed at this site and a 0 degree camera was introduced. The abdominal cavity was carefully inspected. There was no evidence of trauma to the peritoneal contents, nor any evidence of injury to the retroperitoneum.  All accessory trocars were then placed under direct vision. TAP block was performed with Exparel The robot was carefully docked.                                                                                                      The peritoneum posterior to the bladder was incised, the right vas deferens                     identified and divided.  The right seminal vesicle was gently dissected away from surrounding tissue with care being taken not to cause  stretch  or thermal injury to the lateral pedicle. A similar procedure was performed  on  the left side.  Both seminal vesicles were retracted anteriorly. Denonvilliers fascia was incised and this plane of dissection carried down to the level of the apex of the prostate.  A posterior/lateral release was performed bilaterally.                                                                                                                                            An anterior bladder drop was performed.  After dropping the bladder, a right sided pelvic lymph node dissection was completed and this was sent as permanent pathologic specimen #1.  The endopelvic fascia on the right  was gently dissected posteriorly, thus beginning the lateral release.  A left sided pelvic lymph node dissection was performed and this was sent  as permanent pathologic specimen #2.  Again, the endopelvic fascia was   gently dissected posteriorly, completing the lateral release.  A V stitch was used in a figure of eight fashion to control the dorsal venous complex. Excellent hemostasis was noted at this juncture. The plane between the bladder neck and prostate base was developed and the urethra was divided, a bladder neck sparing approach was utilized.  Excellent preservation of the internal sphincter was achieved circumferentially.     The bladder neck biopsy was sent as permanent section specimen #3.      After dividing the posterior bladder neck, the seminal vesicles and vas ampulla were revisualized and retracted anteriorly. The lateral pedicle on the right was controlled with bipolar cautery.      Cold scissors were used to athermically dissect the neurovascular bundle away  from the capsule of the prostate down to the level of the urethra, thus preserving the right neurovascular bundle.      A similar procedure was performed on the left side.    The urethra at the apex was divided preserving maximal urethral length.The rectourethralis  was divided. The specimen was placed in the right colic gutter. The pelvis was  irrigated and carefully inspected.  There was no evidence of rectal trauma and there was excellent hemostasis.  A vesicourethral anastomosis was then performed with a running suture of 3-0 Monocryl.  After completing the anastomosis, a fresh Mercedes catheter was advanced into the bladder and the balloon  inflated.  The bladder was irrigated, there was noted to be no extravasation at the anastomosis. The specimen was placed in an endocatch bag.     Throughout the procedure the first assistant assisted with positioning, trocar placement, docking. She also provided assistance with exposure, visualization, traction/countertraction, suction and irrigation.     A drain was placed, specimen was removed from the field and port sites were closed.  Needle and sponge counts were correct.  The patient was transferred to the Recovery Room in stable condition.

## 2017-07-17 NOTE — NURSING
Patient admitted to room, VS, AAOX4. Wife at bedside. Call light within reach, bed locked, two side rails up. Food menu given. SCDs on and functioning. Report handed off to primary nurse.

## 2017-07-17 NOTE — PROGRESS NOTES
Vital signs monitored and recorded, intake and output monitored and recorded, arousable, remain comfortable

## 2017-07-17 NOTE — NURSING TRANSFER
Nursing Transfer Note      7/17/2017     Transfer To: 542a    Transfer via stretcher    Transfer with iv infusion    Transported by pct    Medicines sent: no    Chart send with patient: Yes    Notified: spouse    Patient reassessed at: July 17, 2017, 1438

## 2017-07-17 NOTE — OR NURSING
All DaVinci instruments inspected before case by Sandra Lopez . All instruments appear to be intact

## 2017-07-17 NOTE — ANESTHESIA POSTPROCEDURE EVALUATION
"Anesthesia Post Evaluation    Patient: Chilango Sy    Procedure(s) Performed: Procedure(s) (LRB):  XI ROBOTIC ASSISTED LAPAROSCOPIC PROSTATECTOMY (N/A)  XI ROBOT ASSISTED LAPAROSCOPIC LYMPH NODE DISSECTION (Bilateral)    Final Anesthesia Type: general  Patient location during evaluation: PACU  Patient participation: Yes- Able to Participate  Level of consciousness: awake and alert and oriented  Post-procedure vital signs: reviewed and stable  Pain management: adequate  Airway patency: patent  PONV status at discharge: No PONV  Anesthetic complications: no      Cardiovascular status: blood pressure returned to baseline and hemodynamically stable  Respiratory status: unassisted and spontaneous ventilation  Hydration status: euvolemic  Follow-up not needed.        Visit Vitals  /63 (BP Location: Left arm, Patient Position: Lying, BP Method: Automatic)   Pulse 72   Temp 35.8 °C (96.4 °F) (Temporal)   Resp 16   Ht 5' 8" (1.727 m)   Wt 90.7 kg (200 lb)   SpO2 99%   BMI 30.41 kg/m²       Pain/Liz Score: Pain Assessment Performed: Yes (7/17/2017  2:00 PM)  Presence of Pain: complains of pain/discomfort (7/17/2017  2:00 PM)  Pain Rating Prior to Med Admin: 4 (7/17/2017  1:45 PM)  Pain Rating Post Med Admin: 3 (7/17/2017  2:30 PM)  Liz Score: 10 (7/17/2017  2:30 PM)      "

## 2017-07-18 VITALS
WEIGHT: 200 LBS | HEART RATE: 96 BPM | HEIGHT: 68 IN | TEMPERATURE: 99 F | SYSTOLIC BLOOD PRESSURE: 117 MMHG | OXYGEN SATURATION: 96 % | DIASTOLIC BLOOD PRESSURE: 62 MMHG | BODY MASS INDEX: 30.31 KG/M2 | RESPIRATION RATE: 16 BRPM

## 2017-07-18 PROCEDURE — 25000003 PHARM REV CODE 250: Performed by: UROLOGY

## 2017-07-18 PROCEDURE — 63600175 PHARM REV CODE 636 W HCPCS: Performed by: UROLOGY

## 2017-07-18 RX ORDER — ACETAMINOPHEN 10 MG/ML
1000 INJECTION, SOLUTION INTRAVENOUS EVERY 8 HOURS
Status: DISCONTINUED | OUTPATIENT
Start: 2017-07-18 | End: 2017-07-18 | Stop reason: HOSPADM

## 2017-07-18 RX ORDER — POLYETHYLENE GLYCOL 3350 17 G/17G
17 POWDER, FOR SOLUTION ORAL DAILY
Qty: 30 PACKET | Refills: 0 | Status: SHIPPED | OUTPATIENT
Start: 2017-07-18 | End: 2018-06-07

## 2017-07-18 RX ORDER — OXYCODONE AND ACETAMINOPHEN 5; 325 MG/1; MG/1
1 TABLET ORAL EVERY 4 HOURS PRN
Qty: 41 TABLET | Refills: 0 | Status: SHIPPED | OUTPATIENT
Start: 2017-07-18 | End: 2017-08-24

## 2017-07-18 RX ADMIN — ACETAMINOPHEN 1000 MG: 10 INJECTION, SOLUTION INTRAVENOUS at 05:07

## 2017-07-18 RX ADMIN — DOCUSATE SODIUM 100 MG: 100 CAPSULE, LIQUID FILLED ORAL at 09:07

## 2017-07-18 RX ADMIN — OXYCODONE HYDROCHLORIDE 5 MG: 5 TABLET ORAL at 09:07

## 2017-07-18 NOTE — SUBJECTIVE & OBJECTIVE
Interval History:   No acute events overnight  Pain controlled  Ambulating  No nausea, tolerating diet    Review of Systems  Objective:     Temp:  [96 °F (35.6 °C)-97.9 °F (36.6 °C)] 96.4 °F (35.8 °C)  Pulse:  [] 94  Resp:  [10-16] 16  SpO2:  [96 %-100 %] 96 %  BP: ()/(44-68) 113/68     Body mass index is 30.41 kg/m².            Drains     Drain                 Closed/Suction Drain 07/17/17 1134 Left LLQ Bulb 10 Fr. less than 1 day         Urethral Catheter 07/17/17 0740 Latex 18 Fr. less than 1 day                Physical Exam   Constitutional: No distress.   HENT:   Head: Normocephalic and atraumatic.   Eyes: Conjunctivae are normal. No scleral icterus.   Neck: Normal range of motion.   Cardiovascular: Normal rate.    Pulmonary/Chest: Effort normal. No respiratory distress.   Abdominal: Soft. He exhibits no distension. There is tenderness (appropriate). There is no rebound and no guarding.   Incisions c/d/i  16 Fr eraoz draining clear yellow  ZI draining SS   Musculoskeletal: Normal range of motion.   SCDs in place   Neurological: He is alert.   Skin: Skin is warm and dry. He is not diaphoretic.     Psychiatric: He has a normal mood and affect.       Significant Labs:    BMP:  No results for input(s): NA, K, CL, CO2, BUN, CREATININE, LABGLOM, GLUCOSE, CALCIUM in the last 168 hours.    CBC:     Recent Labs  Lab 07/17/17  1648   WBC 11.90   HGB 13.4*   HCT 37.8*          All pertinent labs results from the past 24 hours have been reviewed.    Significant Imaging:  All pertinent imaging results/findings from the past 24 hours have been reviewed.

## 2017-07-18 NOTE — DISCHARGE SUMMARY
[facility]  Urology  Discharge Summary      Patient Name: Chilango Sy  MRN: [id number]  Admission Date: 7/17/2017  Hospital Length of Stay: 1 days  Discharge Date and Time: 07/18/2017 9:39 AM  Attending Physician: Luis Angel MD   Discharging Provider: Tere Servin MD  Primary Care Physician: BECKI Maher Iii, MD    HPI: Chilango Sy is a 61 y.o. male with Luis 3+4 prostate cancer.     Procedure(s) (LRB):  XI ROBOTIC ASSISTED LAPAROSCOPIC PROSTATECTOMY (N/A)  XI ROBOT ASSISTED LAPAROSCOPIC LYMPH NODE DISSECTION (Bilateral)     Indwelling Lines/Drains at time of discharge:   Lines/Drains/Airways     Drain                 Urethral Catheter 07/17/17 0740 Latex 18 Fr. 1 day         Closed/Suction Drain 07/17/17 1134 Left LLQ Bulb 10 Fr. less than 1 day                Hospital Course (synopsis of major diagnoses, care, treatment, and services provided during the course of the hospital stay): Patient admitted following RALP. He tolerated the procedure well with no complications. On POD 1 his pain was well controlled, he was ambulating and tolerating a voiding trial. His drain was removed prior to discharge. He was discharged home with erazo in place.     Consults:     Significant Diagnostic Studies: see chart review    Pending Diagnostic Studies:     None          Final Active Diagnoses:    Diagnosis Date Noted POA    PRINCIPAL PROBLEM:  Prostate cancer [C61] 06/15/2017 Yes      Problems Resolved During this Admission:    Diagnosis Date Noted Date Resolved POA       Discharged Condition: good    Disposition: Home or Self Care    Follow Up:  Follow-up Information     Luis Angel MD In 1 week.    Specialty:  Urology  Why:  erazo removal  Contact information:  [facility] [address]  [phone]                 Patient Instructions:     Diet general     No dressing needed     Call MD for:  temperature >100.4     Call MD for:  persistent nausea and  vomiting or diarrhea     Call MD for:  severe uncontrolled pain     Call MD for:  redness, tenderness, or signs of infection (pain, swelling, redness, odor or green/yellow discharge around incision site)     Call MD for:  difficulty breathing or increased cough     Call MD for:  severe persistent headache     Call MD for:  worsening rash     Call MD for:  persistent dizziness, light-headedness, or visual disturbances     Call MD for:  increased confusion or weakness     Type And Screen Preop   Standing Status: Future  Standing Exp. Date: 09/09/18       Medications:  Reconciled Home Medications:   Current Discharge Medication List      START taking these medications    Details   oxycodone-acetaminophen (PERCOCET) 5-325 mg per tablet Take 1 tablet by mouth every 4 (four) hours as needed for Pain.  Qty: 41 tablet, Refills: 0      polyethylene glycol (GLYCOLAX) 17 gram PwPk Take 17 g by mouth once daily.  Qty: 30 packet, Refills: 0         CONTINUE these medications which have NOT CHANGED    Details   docusate sodium (COLACE) 100 MG capsule Take 100 mg by mouth 2 (two) times daily.             Time spent on the discharge of patient: 20 minutes    Tere Servin MD  Urology  [facility]

## 2017-07-18 NOTE — NURSING
Patient given discharge instructions and prescriptions. Wife and patient verbalized understanding. Leg bag connected to erazo for patient to go home with and instructed how to care for. Patient and wife verbalized understanding.  bag given just in case. Transport called.

## 2017-07-18 NOTE — PROGRESS NOTES
[facility]  Urology  Progress Note    Patient Name: Chilango Sy  MRN: [id number]  Admission Date: 7/17/2017  Hospital Length of Stay: 1 days  Code Status: No Order   Attending Provider: Luis Angel MD   Primary Care Physician: BECKI Maher Iii, MD    Subjective:     HPI:  Chilango Sy is a 61 y.o. male POD 1 s/p RALP    Interval History:   No acute events overnight  Pain controlled  Ambulating  No nausea, tolerating diet    Review of Systems  Objective:     Temp:  [96 °F (35.6 °C)-97.9 °F (36.6 °C)] 96.4 °F (35.8 °C)  Pulse:  [] 94  Resp:  [10-16] 16  SpO2:  [96 %-100 %] 96 %  BP: ()/(44-68) 113/68     Body mass index is 30.41 kg/m².            Drains     Drain                 Closed/Suction Drain 07/17/17 1134 Left LLQ Bulb 10 Fr. less than 1 day         Urethral Catheter 07/17/17 0740 Latex 18 Fr. less than 1 day                Physical Exam   Constitutional: No distress.   HENT:   Head: Normocephalic and atraumatic.   Eyes: Conjunctivae are normal. No scleral icterus.   Neck: Normal range of motion.   Cardiovascular: Normal rate.    Pulmonary/Chest: Effort normal. No respiratory distress.   Abdominal: Soft. He exhibits no distension. There is tenderness (appropriate). There is no rebound and no guarding.   Incisions c/d/i  16 Fr erazo draining clear yellow  ZI draining SS   Musculoskeletal: Normal range of motion.   SCDs in place   Neurological: He is alert.   Skin: Skin is warm and dry. He is not diaphoretic.     Psychiatric: He has a normal mood and affect.       Significant Labs:    BMP:  No results for input(s): NA, K, CL, CO2, BUN, CREATININE, LABGLOM, GLUCOSE, CALCIUM in the last 168 hours.    CBC:     Recent Labs  Lab 07/17/17  1648   WBC 11.90   HGB 13.4*   HCT 37.8*          All pertinent labs results from the past 24 hours have been reviewed.    Significant Imaging:  All pertinent imaging results/findings from the past 24 hours have been  reviewed.      Assessment/Plan:     * Prostate cancer    - IV tylenol, PO oxycodone for pain control  - regular diet  - dc MIVF  - Ambulate pm of surgery and qid  - Drains: Maintain ZI, Home with erazo, leg bag,  bag, and teaching  - Prophylaxis: IS, SCDs, GI ppx    DC home this am            VTE Risk Mitigation         Ordered     Medium Risk of VTE  Once      07/17/17 0511     Place sequential compression device  Until discontinued      07/17/17 0511          Tere Servin MD  Urology  [facility]

## 2017-07-18 NOTE — DISCHARGE INSTRUCTIONS
What to expect with your Robotic Assisted Laparoscopic Prostatectomy.  [facility]  Updated 06/10/2011   Surgery  o Your surgery will last between 1.5 - 3 hours.   After surgery  o You may or may not have a drain that is shaped like a grenade and put to suction  - This drain usually comes out on Post Op Day (POD) 1. It will remain if the output is high and the nurses will teach you how to record the output and you will come back a few days after you leave to have the drain removed  o You will have a catheter after your surgery.  - This catheter protects your tissues to allow for healing between the bladder neck and the urethra now that the prostate has been removed.  - NO ONE IS TO REMOVE THIS CATHETER OR CHANGE THE CATHETER OTHER THAN SOMEONE FROM [facility].  - If you have to go to the ER because your catheter is not draining, come to the [facility] if possible and they will call us if they are not able to irrigate your catheter.  - If you live out of town and have to go to a local ER, then DO NOT let that doctor remove your catheter. If they are unable to irrigate the catheter or are having troubles with it, have them page the [facility] resident on call immediately at [phone] to help answer any questions.  - The catheter should come out in 7-10 days.   - You will have a midline incision after surgery where the prostate was removed. This will be sewn with absorbable suture so you do not need to worry about having the sutures removed.  - You will have smaller incisions where the instruments were inserted that are also sewn closed with absorbable sutures.  o The night of surgery we expect and hope that you will:  - Walk - walking helps get the bowels moving. Also after your surgery, you are at a risk for a deep venous thrombosis (which is a clot in the legs that can form by remaining inactive or still for extended periods of time) and this can travel to your lungs and make you  feel short of breath. This is a very serious condition. Walking helps prevent a DVT from occurring.  - Eat - you do not have to eat a whole meal, but we want to make sure you can tolerate liquid and/or solid food without nausea and vomiting  - Use your incentive spirometer - this is the breathing apparatus that helps you expand your lungs. If and when you have pain you will not want to take deep breaths. But if you dont take deep breaths, you are at risk for pneumonia. The incentive spirometer will help prevent this from occurring by expanding your lungs.  o Symptoms you may experience immediately post-op:  - Bloating and/or shoulder pain - when we do this operation, we fill up your abdominal cavity with gas to better help us visualize the organs and allow our instruments to fit. After the surgery, not all the air can be removed and your body will eventually absorb this small amount of air. However this can make you feel bloated. In addition, when you sit up, the air can sit right under a muscle (the diaphragm) which has connecting nerves to the shoulders, which could explain why you have shoulder pain.  - Do not expect necessarily to have gas or to have a bowel movement - this goes along with the bloating, you may feel like you want to pass gas or have a bowel movement but you cant. This is normal and you will feel like this for a couple days. There are no pills to help with this. Small walks throughout the day should help with this.  - Pain - your pain should be able to be controlled with medicines by mouth that we prescribe. It is important for you to tell us if you are on any pain medications at home before the surgery as you may need stronger pain meds while in the hospital.  - Bladder spasms - this feels like you have to urinate but cant. Sometimes it can be due to clots clogging up your catheter. The nurse will irrigate the catheter, if there are no clots we can prescribe you an anti-spasmodic. We can also  send you home with a prescription for one.   If you go home on anti-spasmodics, do not take one the morning of your appointment to have your catheter removed or you may not be able to void.   You can go home when:  o Pain is controlled with medicines by mouth  o You are able to walk without difficulty or pain  o You are tolerating a regulating diet  o Your catheter is draining freely   When you go home:  o Catheter care  - Blood in your urine (hematuria) is normal. However if you are having clots or your catheter stops draining and you are experiencing abdominal pain, go to the ER.  - If the tip of your penis hurts with the catheter in place, you can put some Vaseline at the end of the catheter to help lubricate the catheter.  o Activity  - Continue to walk - small walks throughout the day are better than one long walk.   - Do not lift anything greater than 8 pounds for 6 weeks - we want your abdominal wall muscles to heal.  o Bowel Movements - Do not strain to have a bowel movement - the pain medicines will make you constipated. That is why we also ask you to take colace 2-3 x per day to help keep your bowels regular. If you are still having trouble, then you can also add Miralax once a day. Do not take any stool softeners if you are having diarrhea.  o Drain - If you have a drain (not your catheter, but a separate drain) then record the output and bring it with you to your next appointment  o Smoking - If you smoke, we encourage you to STOP. Smoking interferes with the healing process and will prolong your healing with continued smoking.  o Driving - Do not drive while you are on pain meds or with your catheter in place.  o Bathing - If you do not have a drain, you can shower 48 hours after your surgery. If you do have a drain, sponge bathe only until the drain is out.  o Dressing - you can remove the dressings if there is no drainage or change them as needed if there is. The little sterile band-aid strips will  fall off on their own in 10-14 days. If they have not fallen off then you can remove them yourself after 14 days.  o Kegels - do not start your Kegels until after your catheter is out.  o Restarting medicines -especially blood thinners (Coumadin, ASA,Plavix), Fish Oil. Discuss this with your physician prior to discharge   When to return to the ER  o Fever - If you have a fever >101.5, this could be due to a number of reasons such as infection of the urine or incision. If your catheter has been removed, you could possibly have a leak. It would be best to come to the ER so they can better evaluate you.  o Severe pain - pain is expected, but severe or new onset of pain is not normal.   o Inability to tolerate food or liquid with nausea and vomiting - it would be best to go to the ER for them to better evaluate you.

## 2017-07-25 ENCOUNTER — OFFICE VISIT (OUTPATIENT)
Dept: UROLOGY | Facility: CLINIC | Age: 62
End: 2017-07-25
Payer: COMMERCIAL

## 2017-07-25 VITALS
SYSTOLIC BLOOD PRESSURE: 137 MMHG | BODY MASS INDEX: 30.41 KG/M2 | WEIGHT: 200.63 LBS | RESPIRATION RATE: 16 BRPM | DIASTOLIC BLOOD PRESSURE: 78 MMHG | HEIGHT: 68 IN | HEART RATE: 89 BPM

## 2017-07-25 DIAGNOSIS — C61 PROSTATE CANCER: Primary | ICD-10-CM

## 2017-07-25 PROCEDURE — 99999 PR PBB SHADOW E&M-EST. PATIENT-LVL III: CPT | Mod: PBBFAC,,, | Performed by: UROLOGY

## 2017-07-25 PROCEDURE — 99499 UNLISTED E&M SERVICE: CPT | Mod: S$GLB,,, | Performed by: UROLOGY

## 2017-08-24 ENCOUNTER — OFFICE VISIT (OUTPATIENT)
Dept: UROLOGY | Facility: CLINIC | Age: 62
End: 2017-08-24
Payer: COMMERCIAL

## 2017-08-24 ENCOUNTER — LAB VISIT (OUTPATIENT)
Dept: LAB | Facility: HOSPITAL | Age: 62
End: 2017-08-24
Attending: UROLOGY
Payer: COMMERCIAL

## 2017-08-24 VITALS
BODY MASS INDEX: 30.74 KG/M2 | DIASTOLIC BLOOD PRESSURE: 90 MMHG | WEIGHT: 202.81 LBS | HEIGHT: 68 IN | RESPIRATION RATE: 16 BRPM | SYSTOLIC BLOOD PRESSURE: 139 MMHG | HEART RATE: 79 BPM

## 2017-08-24 DIAGNOSIS — C61 PROSTATE CANCER: ICD-10-CM

## 2017-08-24 DIAGNOSIS — R30.0 DYSURIA: ICD-10-CM

## 2017-08-24 DIAGNOSIS — C61 PROSTATE CANCER: Primary | ICD-10-CM

## 2017-08-24 LAB — COMPLEXED PSA SERPL-MCNC: <0.01 NG/ML

## 2017-08-24 PROCEDURE — 99499 UNLISTED E&M SERVICE: CPT | Mod: S$GLB,,, | Performed by: UROLOGY

## 2017-08-24 PROCEDURE — 84153 ASSAY OF PSA TOTAL: CPT

## 2017-08-24 PROCEDURE — 99999 PR PBB SHADOW E&M-EST. PATIENT-LVL III: CPT | Mod: PBBFAC,,, | Performed by: UROLOGY

## 2017-08-24 PROCEDURE — 36415 COLL VENOUS BLD VENIPUNCTURE: CPT

## 2017-08-24 PROCEDURE — 87088 URINE BACTERIA CULTURE: CPT

## 2017-08-24 PROCEDURE — 87186 SC STD MICRODIL/AGAR DIL: CPT

## 2017-08-24 PROCEDURE — 87086 URINE CULTURE/COLONY COUNT: CPT

## 2017-08-24 PROCEDURE — 87077 CULTURE AEROBIC IDENTIFY: CPT

## 2017-08-24 NOTE — PROGRESS NOTES
Clinic Note  8/24/2017      Subjective:         Chief Complaint:   PRINCE Sy is a 62 y.o. male  s/p robotic assisted laparoscopic prostatectomy on 7/17/2017.  Continent and pad free.. Doing Kegels.      Path:  FINAL PATHOLOGIC DIAGNOSIS  1 4. Surgical pathology cancer case summary:  Prostate, radical prostatectomy:  Specimen size:  Weight: 44 g.  Size: 4.7 x 3.1 x 3.1 cm.  Histologic type: Acinar adenocarcinoma.  [place] score: 3+4 =7.  Primary pattern: Pattern 3, 80%  Secondary pattern: Pattern 4, 20%  Tumor quantitation: Estimated percentage of prostate involved by tumor is 20%  Extraprostatic extension: Not identified  Seminal vesicle invasion: Not identified  Urinary bladder neck invasion: Not identified  Margins: Uninvolved by invasive carcinoma  Lymph vascular invasion: Not identified  Perineural invasion: Present  Treatment effect of carcinoma: No known presurgical therapy  Lymph node sampling: 3 lymph nodes examined, 0 involved.  Pathologic stage (AJCC 2010): pT2 pN0      Lab Results   Component Value Date    PSADIAG <0.01 08/24/2017      Past Medical History:   Diagnosis Date    Prostate cancer      Family History   Problem Relation Age of Onset    Cancer Father     Heart disease Father      Social History     Social History    Marital status:      Spouse name: N/A    Number of children: N/A    Years of education: N/A     Occupational History    Not on file.     Social History Main Topics    Smoking status: Former Smoker    Smokeless tobacco: Never Used    Alcohol use Yes    Drug use: No    Sexual activity: Not on file     Other Topics Concern    Not on file     Social History Narrative    No narrative on file     Past Surgical History:   Procedure Laterality Date    HERNIA REPAIR      PROSTATE SURGERY      TUMOR REMOVAL  1961     Patient Active Problem List   Diagnosis    Prostate cancer     Review of Systems      Objective:      There were no vitals taken for this  "visit.  Estimated body mass index is 30.5 kg/m² as calculated from the following:    Height as of 7/25/17: 5' 8" (1.727 m).    Weight as of 7/25/17: 91 kg (200 lb 9.9 oz).  Physical Exam      Assessment and Plan:           Problem List Items Addressed This Visit     Prostate cancer - Primary      Other Visit Diagnoses    None.         Follow up:   4 months with PSA.  Full activity.    Luis Angel        "

## 2017-08-26 LAB — BACTERIA UR CULT: NORMAL

## 2017-08-27 RX ORDER — CIPROFLOXACIN 500 MG/1
500 TABLET ORAL EVERY 12 HOURS
Qty: 14 TABLET | Refills: 0 | Status: SHIPPED | OUTPATIENT
Start: 2017-08-27 | End: 2017-09-03

## 2017-09-25 ENCOUNTER — LAB VISIT (OUTPATIENT)
Dept: LAB | Facility: HOSPITAL | Age: 62
End: 2017-09-25
Attending: UROLOGY
Payer: COMMERCIAL

## 2017-09-25 DIAGNOSIS — R30.0 DYSURIA: Primary | ICD-10-CM

## 2017-09-25 DIAGNOSIS — R30.0 DYSURIA: ICD-10-CM

## 2017-09-25 PROCEDURE — 87086 URINE CULTURE/COLONY COUNT: CPT

## 2017-09-26 LAB — BACTERIA UR CULT: NO GROWTH

## 2017-12-06 ENCOUNTER — LAB VISIT (OUTPATIENT)
Dept: LAB | Facility: HOSPITAL | Age: 62
End: 2017-12-06
Attending: UROLOGY
Payer: COMMERCIAL

## 2017-12-06 DIAGNOSIS — C61 PROSTATE CANCER: ICD-10-CM

## 2017-12-06 LAB — COMPLEXED PSA SERPL-MCNC: <0.01 NG/ML

## 2017-12-06 PROCEDURE — 36415 COLL VENOUS BLD VENIPUNCTURE: CPT

## 2017-12-06 PROCEDURE — 84153 ASSAY OF PSA TOTAL: CPT

## 2017-12-07 ENCOUNTER — OFFICE VISIT (OUTPATIENT)
Dept: UROLOGY | Facility: CLINIC | Age: 62
End: 2017-12-07
Payer: COMMERCIAL

## 2017-12-07 VITALS
WEIGHT: 198 LBS | RESPIRATION RATE: 15 BRPM | HEART RATE: 68 BPM | BODY MASS INDEX: 30.01 KG/M2 | SYSTOLIC BLOOD PRESSURE: 130 MMHG | DIASTOLIC BLOOD PRESSURE: 73 MMHG | HEIGHT: 68 IN

## 2017-12-07 DIAGNOSIS — C61 PROSTATE CANCER: Primary | ICD-10-CM

## 2017-12-07 PROCEDURE — 99999 PR PBB SHADOW E&M-EST. PATIENT-LVL IV: CPT | Mod: PBBFAC,,, | Performed by: UROLOGY

## 2017-12-07 PROCEDURE — 99214 OFFICE O/P EST MOD 30 MIN: CPT | Mod: S$GLB,,, | Performed by: UROLOGY

## 2017-12-07 NOTE — PROGRESS NOTES
Clinic Note  12/7/2017      Subjective:         Chief Complaint:   PRINCE Sy is a 62 y.o. male  s/p robotic assisted laparoscopic prostatectomy on 7/17/2017.  Continent and pad free.. Doing Kegels.  Harvested 1 deer and 46 squirrels.        Path:  FINAL PATHOLOGIC DIAGNOSIS  1 4. Surgical pathology cancer case summary:  Prostate, radical prostatectomy:  Specimen size:  Weight: 44 g.  Size: 4.7 x 3.1 x 3.1 cm.  Histologic type: Acinar adenocarcinoma.  Hico score: 3+4 =7.  Primary pattern: Pattern 3, 80%  Secondary pattern: Pattern 4, 20%  Tumor quantitation: Estimated percentage of prostate involved by tumor is 20%  Extraprostatic extension: Not identified  Seminal vesicle invasion: Not identified  Urinary bladder neck invasion: Not identified  Margins: Uninvolved by invasive carcinoma  Lymph vascular invasion: Not identified  Perineural invasion: Present  Treatment effect of carcinoma: No known presurgical therapy  Lymph node sampling: 3 lymph nodes examined, 0 involved.  Pathologic stage (AJCC 2010): pT2 pN0         Lab Results   Component Value Date    PSADIAG <0.01 12/06/2017    PSADIAG <0.01 08/24/2017      Past Medical History:   Diagnosis Date    Elevated PSA     Prostate cancer      Family History   Problem Relation Age of Onset    Cancer Father     Heart disease Father      Social History     Social History    Marital status:      Spouse name: N/A    Number of children: N/A    Years of education: N/A     Occupational History    Not on file.     Social History Main Topics    Smoking status: Former Smoker     Packs/day: 1.00     Years: 6.00    Smokeless tobacco: Former User     Types: Chew    Alcohol use Yes    Drug use: No    Sexual activity: Yes     Partners: Female     Other Topics Concern    Not on file     Social History Narrative    No narrative on file     Past Surgical History:   Procedure Laterality Date    HERNIA REPAIR      PROSTATE SURGERY      TUMOR REMOVAL   "1961     Patient Active Problem List   Diagnosis    Prostate cancer     Review of Systems   Constitutional: Negative for appetite change, chills, fatigue, fever and unexpected weight change.   HENT: Negative for nosebleeds.    Respiratory: Negative for shortness of breath and wheezing.    Cardiovascular: Negative for chest pain, palpitations and leg swelling.   Gastrointestinal: Negative for abdominal distention, abdominal pain, constipation, diarrhea, nausea and vomiting.   Genitourinary: Negative for dysuria and hematuria.   Musculoskeletal: Negative for arthralgias and back pain.   Skin: Negative for pallor.   Neurological: Negative for dizziness, seizures and syncope.   Hematological: Negative for adenopathy.   Psychiatric/Behavioral: Negative for dysphoric mood.         Objective:      There were no vitals taken for this visit.  Estimated body mass index is 30.84 kg/m² as calculated from the following:    Height as of 8/24/17: 5' 8" (1.727 m).    Weight as of 8/24/17: 92 kg (202 lb 13.2 oz).  Physical Exam   Constitutional: He is oriented to person, place, and time. He appears well-developed and well-nourished. No distress.   HENT:   Head: Atraumatic.   Neck: No tracheal deviation present.   Cardiovascular: Normal rate.    Pulmonary/Chest: Effort normal. No respiratory distress. He has no wheezes.   Abdominal: Soft. Bowel sounds are normal. He exhibits no distension and no mass. There is no tenderness. There is no rebound and no guarding.   Neurological: He is alert and oriented to person, place, and time.   Skin: Skin is warm and dry. He is not diaphoretic.     Psychiatric: He has a normal mood and affect. His behavior is normal. Judgment and thought content normal.         Assessment and Plan:           Problem List Items Addressed This Visit     Prostate cancer - Primary          Follow up:   6 months with PSA.    Luis Angel      Distress Screening Results: Psychosocial Distress screening score of " Distress Score: 0 noted and reviewed. No intervention indicated.

## 2018-01-25 ENCOUNTER — OFFICE VISIT (OUTPATIENT)
Dept: UROLOGY | Facility: CLINIC | Age: 63
End: 2018-01-25
Payer: COMMERCIAL

## 2018-01-25 VITALS
WEIGHT: 195.75 LBS | SYSTOLIC BLOOD PRESSURE: 140 MMHG | DIASTOLIC BLOOD PRESSURE: 80 MMHG | BODY MASS INDEX: 29.67 KG/M2 | HEIGHT: 68 IN | HEART RATE: 74 BPM

## 2018-01-25 DIAGNOSIS — C61 PROSTATE CANCER: ICD-10-CM

## 2018-01-25 DIAGNOSIS — N52.31 ERECTILE DYSFUNCTION FOLLOWING RADICAL PROSTATECTOMY: Primary | ICD-10-CM

## 2018-01-25 PROCEDURE — 99999 PR PBB SHADOW E&M-EST. PATIENT-LVL III: CPT | Mod: PBBFAC,,, | Performed by: UROLOGY

## 2018-01-25 PROCEDURE — 99214 OFFICE O/P EST MOD 30 MIN: CPT | Mod: S$GLB,,, | Performed by: UROLOGY

## 2018-01-25 RX ORDER — SILDENAFIL CITRATE 20 MG/1
TABLET ORAL
Qty: 50 TABLET | Refills: 11 | Status: SHIPPED | OUTPATIENT
Start: 2018-01-25 | End: 2019-11-26

## 2018-01-25 RX ORDER — AMOXICILLIN 500 MG/1
500 CAPSULE ORAL
COMMUNITY
End: 2018-06-07

## 2018-01-25 NOTE — PATIENT INSTRUCTIONS
Oral Medications for Erectile Dysfunction  Prescription oral medications can be used for ED. They often work well. But, like all medications, they can have side effects. Also, they cant be used if a man has certain health problems or takes certain other medications. Talk with your doctor about oral ED medication. Ask whether it is right for you.  Types of Oral ED Medications  There are three types of prescription oral ED medications. Each one increases blood flow to the penis. When the penis is stimulated, an erection results. The three types are:  · Sildenafil citrate (Viagra)  · Tadalafil (Cialis)  · Vardenafil HCl (Levitra)  What Oral ED Medications Dont Do  There are some things ED medications cant do.  · They dont cure the cause of your ED. Without the medication, youll still have trouble getting an erection.  · They cant produce an erection without sexual stimulation.  · They wont increase sexual desire. They also wont solve any other sexual issues. Psychological, emotional, or relationship issues will not be fixed.  Taking Oral ED Medications Safely  · Do not combine ED medications with other treatments unless your doctor tells you to.  · Dont take ED medications more often or in larger doses than prescribed.  · Tell your doctor your health history. Mention all medications you take. This includes over-the-counter drugs, supplements, and herbs.  · Ask your doctor about whether you can drink alcohol while taking ED medication.  Possible Side Effects of Oral ED Medications  · Headache  · Facial flushing  · Runny or stuffy nose  · Indigestion  · Distortion of your color vision for a short time  · Sudden vision loss or hearing loss (rare)  Risks of Oral ED Medications   · Do not take ED medications if you take medications containing nitrates. The combination may drop your blood pressure to a dangerous level. Nitrates include nitroglycerin (a drug for angina). They are also in poppers, an inhaled  recreational drug. If youre not sure whether youre taking nitrates, ask your doctor or pharmacist.  · Medications called alpha-blockers can interact with ED medications. They can cause a sudden drop in blood pressure. Alpha-blockers are a common treatment for prostate problems. They also treat high blood pressure. Be sure your doctor knows if you take these medications.  · If youve had a heart attack or have heart disease and you have not had sex for a while, talk to your doctor. Having sex again can put extra strain on your heart. Your doctor can confirm that your heart is healthy enough for sex.  · It is rare, but some men taking ED medications have had sudden vision loss. This may be more likely if other health problems are present. These include high blood pressure and diabetes. Ask your doctor if you are at risk for this type of vision loss.  · In rare cases, an erection may last too long. This can badly damage the blood vessels in your penis. If an erection lasts longer than 4 hours, go to the emergency room right away.       © 5215-7465 [employer], [address] [place] [address], [place] [address]. All rights reserved. This information is not intended as a substitute for professional medical care. Always follow your healthcare professional's instructions.

## 2018-01-25 NOTE — PROGRESS NOTES
Mr. Sy is a 62 y.o. male presenting for a consultation at the request of Dr. Angel. Patient presents with ED.    PRESENTING ILLNESS:    Chilango Sy is a 62 y.o. male s/p RALP on 7/17/17 by Dr. Angel.  Since then, he c/o ED.  Prior to surgery, he did not have ED.  His wife does have a colovaginal fistula.    He does not have MURRAY.  He is wearing 0 pads/day.  No hematuria.  No dysuria.  Good FOS.    No bone pain or weight loss.    REVIEW OF SYSTEMS:    Chilango Sy denies headache, blurred vision, fever, nausea, vomiting, chills, flank discomfort, abdominal pain, bleeding per rectum, cough, SOB, recent loss of consciousness, recent mental status changes, seizures, dizziness, or upper or lower extremity weakness.    JULITO  1. 1  2. 0  3. 0  4. 0  5. 0      PATIENT HISTORY:    Past Medical History:   Diagnosis Date    Elevated PSA     Prostate cancer        Family History   Problem Relation Age of Onset    Cancer Father     Heart disease Father        Past Surgical History:   Procedure Laterality Date    HERNIA REPAIR      PROSTATE SURGERY      TUMOR REMOVAL  1961       Social History     Social History    Marital status:      Spouse name: N/A    Number of children: N/A    Years of education: N/A     Social History Main Topics    Smoking status: Former Smoker     Packs/day: 1.00     Years: 6.00    Smokeless tobacco: Former User     Types: Chew    Alcohol use Yes    Drug use: No    Sexual activity: Yes     Partners: Female     Other Topics Concern    None     Social History Narrative    None       Review of patient's allergies indicates:  No Known Allergies      Current Outpatient Prescriptions:     amoxicillin (AMOXIL) 500 MG capsule, Take 500 mg by mouth every 8 (eight) hours., Disp: , Rfl:     docusate sodium (COLACE) 100 MG capsule, Take 100 mg by mouth 2 (two) times daily., Disp: , Rfl:     polyethylene glycol (GLYCOLAX) 17 gram PwPk, Take 17 g by mouth once daily., Disp: 30  packet, Rfl: 0    sildenafil, antihypertensive, (REVATIO) 20 mg Tab, Take 5 po 1 hour before sexual activity, Disp: 50 tablet, Rfl: 11      PHYSICAL EXAMINATION:    The patient generally appears in good health, is appropriately interactive, and is in no apparent distress.     Eyes: anicteric sclerae, moist conjunctivae; no lid-lag; PERRLA     HENT: Atraumatic; oropharynx clear with moist mucous membranes and no mucosal ulcerations;normal hard and soft palate. No evidence of lymphadenopathy.    Neck: Trachea midline.  No thyromegaly.    Musculoskeletal: No abnormal gait.    Skin: No lesions.    Mental: Cooperative with normal affect.  Is oriented to time, place, and person.    Neuro: Grossly intact.    Chest: Normal inspiratory effort.   No accessory muscles.  No audible wheezes.  Respirations symmetric on inspiration and expiration.    Heart: Regular rhythm.      Abdomen:  Soft, non-tender. No masses or organomegaly. Bladder is not palpable. No evidence of flank discomfort. No evidence of inguinal hernia.    Genitourinary: The penis is circumcised with no evidence of plaques or induration. The urethral meatus is normal. The testes, epididymides, and cord structures are normal in size and contour bilaterally. The scrotum is normal in size and contour.    Extremities: No clubbing, cyanosis, or edema        LABS:      Lab Results   Component Value Date    PSADIAG <0.01 12/06/2017    PSADIAG <0.01 08/24/2017        IMPRESSION:    Encounter Diagnoses   Name Primary?    Erectile dysfunction following radical prostatectomy Yes    Prostate cancer        PLAN:    1. Discussed penile rehab today.  The risks, benefits, and the evidence was discussed with him today.  We discussed different options.  He would like to try generic sildenafil.  Side effects discussed.  A new Rx was given.  2. RTC 6 months to re-evaluate.    Copy to:

## 2018-06-06 ENCOUNTER — LAB VISIT (OUTPATIENT)
Dept: LAB | Facility: HOSPITAL | Age: 63
End: 2018-06-06
Attending: UROLOGY
Payer: COMMERCIAL

## 2018-06-06 DIAGNOSIS — C61 PROSTATE CANCER: ICD-10-CM

## 2018-06-06 LAB — COMPLEXED PSA SERPL-MCNC: <0.01 NG/ML

## 2018-06-06 PROCEDURE — 84153 ASSAY OF PSA TOTAL: CPT

## 2018-06-06 PROCEDURE — 36415 COLL VENOUS BLD VENIPUNCTURE: CPT

## 2018-06-07 ENCOUNTER — OFFICE VISIT (OUTPATIENT)
Dept: UROLOGY | Facility: CLINIC | Age: 63
End: 2018-06-07
Payer: COMMERCIAL

## 2018-06-07 VITALS
RESPIRATION RATE: 16 BRPM | HEART RATE: 77 BPM | BODY MASS INDEX: 28.19 KG/M2 | HEIGHT: 68 IN | WEIGHT: 186 LBS | DIASTOLIC BLOOD PRESSURE: 74 MMHG | SYSTOLIC BLOOD PRESSURE: 128 MMHG

## 2018-06-07 DIAGNOSIS — C61 PROSTATE CANCER: Primary | ICD-10-CM

## 2018-06-07 PROCEDURE — 99214 OFFICE O/P EST MOD 30 MIN: CPT | Mod: S$GLB,,, | Performed by: UROLOGY

## 2018-06-07 PROCEDURE — 99999 PR PBB SHADOW E&M-EST. PATIENT-LVL III: CPT | Mod: PBBFAC,,, | Performed by: UROLOGY

## 2018-06-07 PROCEDURE — 3008F BODY MASS INDEX DOCD: CPT | Mod: CPTII,S$GLB,, | Performed by: UROLOGY

## 2018-06-07 NOTE — PROGRESS NOTES
Clinic Note  6/7/2018      Subjective:         Chief Complaint:   PRINCE Sy is a 62 y.o. male s/p robotic assisted laparoscopic prostatectomy on 7/17/2017.  Continent and pad free.. Doing Kegels. Relates AM dysuria.  Harvested 1 deer and 46 squirrels.  POCT UA- normal        Path:  FINAL PATHOLOGIC DIAGNOSIS  1 4. Surgical pathology cancer case summary:  Prostate, radical prostatectomy:  Specimen size:  Weight: 44 g.  Size: 4.7 x 3.1 x 3.1 cm.  Histologic type: Acinar adenocarcinoma.  [place] score: 3+4 =7.  Primary pattern: Pattern 3, 80%  Secondary pattern: Pattern 4, 20%  Tumor quantitation: Estimated percentage of prostate involved by tumor is 20%  Extraprostatic extension: Not identified  Seminal vesicle invasion: Not identified  Urinary bladder neck invasion: Not identified  Margins: Uninvolved by invasive carcinoma  Lymph vascular invasion: Not identified  Perineural invasion: Present  Treatment effect of carcinoma: No known presurgical therapy  Lymph node sampling: 3 lymph nodes examined, 0 involved.  Pathologic stage (AJCC 2010): pT2 pN0      Lab Results   Component Value Date    PSADIAG <0.01 06/06/2018    PSADIAG <0.01 12/06/2017    PSADIAG <0.01 08/24/2017      Past Medical History:   Diagnosis Date    Prostate cancer      Family History   Problem Relation Age of Onset    Cancer Father     Heart disease Father      Social History     Social History    Marital status:      Spouse name: N/A    Number of children: N/A    Years of education: N/A     Occupational History    Not on file.     Social History Main Topics    Smoking status: Former Smoker     Packs/day: 1.00     Years: 6.00    Smokeless tobacco: Former User     Types: Chew    Alcohol use Yes    Drug use: No    Sexual activity: Yes     Partners: Female     Other Topics Concern    Not on file     Social History Narrative    No narrative on file     Past Surgical History:   Procedure Laterality Date    HERNIA REPAIR    "   PROSTATE SURGERY      TUMOR REMOVAL  1961     Patient Active Problem List   Diagnosis    Prostate cancer     Review of Systems   Constitutional: Negative for appetite change, chills, fatigue, fever and unexpected weight change.   HENT: Negative for nosebleeds.    Respiratory: Negative for shortness of breath and wheezing.    Cardiovascular: Negative for chest pain, palpitations and leg swelling.   Gastrointestinal: Negative for abdominal distention, abdominal pain, constipation, diarrhea, nausea and vomiting.   Genitourinary: Positive for dysuria. Negative for nocturia.   Musculoskeletal: Negative for arthralgias and back pain.   Skin: Negative for pallor.   Neurological: Negative for dizziness, seizures and syncope.   Hematological: Negative for adenopathy.   Psychiatric/Behavioral: Negative for dysphoric mood.         Objective:      /74   Pulse 77   Resp 16   Ht 5' 8" (1.727 m)   Wt 84.4 kg (186 lb)   BMI 28.28 kg/m²   Estimated body mass index is 28.28 kg/m² as calculated from the following:    Height as of this encounter: 5' 8" (1.727 m).    Weight as of this encounter: 84.4 kg (186 lb).  Physical Exam   Constitutional: He is oriented to person, place, and time. He appears well-developed and well-nourished. No distress.   HENT:   Head: Atraumatic.   Neck: No tracheal deviation present.   Cardiovascular: Normal rate.    Pulmonary/Chest: Effort normal. No respiratory distress. He has no wheezes.   Abdominal: Soft. Bowel sounds are normal. He exhibits no distension and no mass. There is no tenderness. There is no rebound and no guarding.   Neurological: He is alert and oriented to person, place, and time.   Skin: Skin is warm and dry. He is not diaphoretic.     Psychiatric: He has a normal mood and affect. His behavior is normal. Judgment and thought content normal.         Assessment and Plan:           Problem List Items Addressed This Visit     Prostate cancer - Primary          Follow up: "   Increase hydration when outside all day.  6 months with PSA.  Luis Angel

## 2018-11-27 ENCOUNTER — LAB VISIT (OUTPATIENT)
Dept: LAB | Facility: HOSPITAL | Age: 63
End: 2018-11-27
Attending: UROLOGY
Payer: COMMERCIAL

## 2018-11-27 DIAGNOSIS — C61 PROSTATE CANCER: ICD-10-CM

## 2018-11-27 LAB — COMPLEXED PSA SERPL-MCNC: <0.01 NG/ML

## 2018-11-27 PROCEDURE — 84153 ASSAY OF PSA TOTAL: CPT

## 2018-11-27 PROCEDURE — 36415 COLL VENOUS BLD VENIPUNCTURE: CPT

## 2018-11-29 ENCOUNTER — OFFICE VISIT (OUTPATIENT)
Dept: UROLOGY | Facility: CLINIC | Age: 63
End: 2018-11-29
Payer: COMMERCIAL

## 2018-11-29 VITALS
HEIGHT: 68 IN | RESPIRATION RATE: 16 BRPM | WEIGHT: 185 LBS | BODY MASS INDEX: 28.04 KG/M2 | HEART RATE: 82 BPM | DIASTOLIC BLOOD PRESSURE: 87 MMHG | SYSTOLIC BLOOD PRESSURE: 138 MMHG

## 2018-11-29 DIAGNOSIS — C61 PROSTATE CANCER: Primary | ICD-10-CM

## 2018-11-29 PROCEDURE — 99999 PR PBB SHADOW E&M-EST. PATIENT-LVL III: CPT | Mod: PBBFAC,,, | Performed by: UROLOGY

## 2018-11-29 PROCEDURE — 3008F BODY MASS INDEX DOCD: CPT | Mod: CPTII,S$GLB,, | Performed by: UROLOGY

## 2018-11-29 PROCEDURE — 99214 OFFICE O/P EST MOD 30 MIN: CPT | Mod: S$GLB,,, | Performed by: UROLOGY

## 2018-11-29 NOTE — PROGRESS NOTES
Clinic Note  11/29/2018      Subjective:         Chief Complaint:   PRINCE Sy Jr. is a 63 y.o. male s/p robotic assisted laparoscopic prostatectomy on 7/17/2017.  Continent and pad free. Doing Kegels.    Has ED, is unable to achieve an erection at all. Tried PDE5i 2-3 months after surgery with no results. Rx'd sildenifil by us but did not fill it. Not interested in repeat pharmacologic therapy. Interested in JEIMY but not at this point in time. Not interested in ICI, MUSE, or IPP.    PSA <0.01 today     Path:  FINAL PATHOLOGIC DIAGNOSIS  1 4. Surgical pathology cancer case summary:  Prostate, radical prostatectomy:  Specimen size:  Weight: 44 g.  Size: 4.7 x 3.1 x 3.1 cm.  Histologic type: Acinar adenocarcinoma.  Luis score: 3+4 =7.  Primary pattern: Pattern 3, 80%  Secondary pattern: Pattern 4, 20%  Tumor quantitation: Estimated percentage of prostate involved by tumor is 20%  Extraprostatic extension: Not identified  Seminal vesicle invasion: Not identified  Urinary bladder neck invasion: Not identified  Margins: Uninvolved by invasive carcinoma  Lymph vascular invasion: Not identified  Perineural invasion: Present  Treatment effect of carcinoma: No known presurgical therapy  Lymph node sampling: 3 lymph nodes examined, 0 involved.  Pathologic stage (AJCC 2010): pT2 pN0    Endorses urinary frequency, nocturia 1x. Denies weakness, fatigue, unintentional weight loss, bone pain. abdominal pain, suprapubic pain, dysuria, gross hematuria, flank pain.    Lab Results   Component Value Date    PSADIAG <0.01 11/27/2018    PSADIAG <0.01 06/06/2018    PSADIAG <0.01 12/06/2017    PSADIAG <0.01 08/24/2017      Past Medical History:   Diagnosis Date    Prostate cancer      Family History   Problem Relation Age of Onset    Cancer Father     Heart disease Father      Social History     Socioeconomic History    Marital status:      Spouse name: Not on file    Number of children: Not on file    Years of  "education: Not on file    Highest education level: Not on file   Social Needs    Financial resource strain: Not on file    Food insecurity - worry: Not on file    Food insecurity - inability: Not on file    Transportation needs - medical: Not on file    Transportation needs - non-medical: Not on file   Occupational History    Not on file   Tobacco Use    Smoking status: Former Smoker     Packs/day: 1.00     Years: 6.00     Pack years: 6.00    Smokeless tobacco: Former User     Types: Chew   Substance and Sexual Activity    Alcohol use: Yes    Drug use: No    Sexual activity: Yes     Partners: Female   Other Topics Concern    Not on file   Social History Narrative    Not on file     Past Surgical History:   Procedure Laterality Date    HERNIA REPAIR      PROSTATE SURGERY      TUMOR REMOVAL  1961    XI ROBOT ASSISTED LAPAROSCOPIC LYMPH NODE DISSECTION Bilateral 7/17/2017    Performed by Luis Angel MD at [facility] OR [address] [place] [address]    XI ROBOTIC ASSISTED LAPAROSCOPIC PROSTATECTOMY N/A 7/17/2017    Performed by Luis Angel MD at [facility] OR [address] [place] [address]     Patient Active Problem List   Diagnosis    Prostate cancer     Review of Systems   Constitutional: Negative for appetite change, chills, fatigue, fever and unexpected weight change.   HENT: Negative for nosebleeds.    Respiratory: Negative for shortness of breath and wheezing.    Cardiovascular: Negative for chest pain, palpitations and leg swelling.   Gastrointestinal: Negative for abdominal distention, abdominal pain, constipation, diarrhea, nausea and vomiting.   Genitourinary: Positive for dysuria. Negative for nocturia.   Musculoskeletal: Negative for arthralgias and back pain.   Skin: Negative for pallor.   Neurological: Negative for dizziness, seizures and syncope.   Hematological: Negative for adenopathy.   Psychiatric/Behavioral: Negative for dysphoric mood.         Objective:      /87   Pulse 82   Resp 16   Ht 5' 8" (1.727 m)   Wt " "83.9 kg (185 lb)   BMI 28.13 kg/m²   Estimated body mass index is 28.13 kg/m² as calculated from the following:    Height as of this encounter: 5' 8" (1.727 m).    Weight as of this encounter: 83.9 kg (185 lb).  Physical Exam   Constitutional: He is oriented to person, place, and time. He appears well-developed and well-nourished. No distress.   HENT:   Head: Atraumatic.   Neck: No tracheal deviation present.   Cardiovascular: Normal rate.    Pulmonary/Chest: Effort normal. No respiratory distress. He has no wheezes.   Abdominal: Soft. Bowel sounds are normal. He exhibits no distension and no mass. There is no tenderness. There is no rebound and no guarding.   Neurological: He is alert and oriented to person, place, and time.   Skin: Skin is warm and dry. He is not diaphoretic.     Psychiatric: He has a normal mood and affect. His behavior is normal. Judgment and thought content normal.         Assessment and Plan:           Problem List Items Addressed This Visit     None          Follow up:     6 months with JAIR.    Andrea Wei        "

## 2019-05-27 ENCOUNTER — LAB VISIT (OUTPATIENT)
Dept: LAB | Facility: HOSPITAL | Age: 64
End: 2019-05-27
Attending: UROLOGY
Payer: COMMERCIAL

## 2019-05-27 DIAGNOSIS — C61 PROSTATE CANCER: ICD-10-CM

## 2019-05-27 LAB — COMPLEXED PSA SERPL-MCNC: <0.01 NG/ML (ref 0–4)

## 2019-05-27 PROCEDURE — 84153 ASSAY OF PSA TOTAL: CPT

## 2019-05-27 PROCEDURE — 36415 COLL VENOUS BLD VENIPUNCTURE: CPT

## 2019-05-28 ENCOUNTER — OFFICE VISIT (OUTPATIENT)
Dept: UROLOGY | Facility: CLINIC | Age: 64
End: 2019-05-28
Payer: COMMERCIAL

## 2019-05-28 VITALS
HEIGHT: 68 IN | DIASTOLIC BLOOD PRESSURE: 79 MMHG | HEART RATE: 67 BPM | BODY MASS INDEX: 28.57 KG/M2 | WEIGHT: 188.5 LBS | SYSTOLIC BLOOD PRESSURE: 143 MMHG

## 2019-05-28 DIAGNOSIS — Z90.79 HISTORY OF ROBOT-ASSISTED LAPAROSCOPIC RADICAL PROSTATECTOMY: ICD-10-CM

## 2019-05-28 DIAGNOSIS — N52.31 ERECTILE DYSFUNCTION FOLLOWING RADICAL PROSTATECTOMY: ICD-10-CM

## 2019-05-28 DIAGNOSIS — N39.3 STRESS INCONTINENCE, MALE: ICD-10-CM

## 2019-05-28 DIAGNOSIS — C61 PROSTATE CANCER: Primary | ICD-10-CM

## 2019-05-28 PROCEDURE — 3008F BODY MASS INDEX DOCD: CPT | Mod: CPTII,S$GLB,, | Performed by: NURSE PRACTITIONER

## 2019-05-28 PROCEDURE — 99999 PR PBB SHADOW E&M-EST. PATIENT-LVL III: ICD-10-PCS | Mod: PBBFAC,,, | Performed by: NURSE PRACTITIONER

## 2019-05-28 PROCEDURE — 3008F PR BODY MASS INDEX (BMI) DOCUMENTED: ICD-10-PCS | Mod: CPTII,S$GLB,, | Performed by: NURSE PRACTITIONER

## 2019-05-28 PROCEDURE — 99214 OFFICE O/P EST MOD 30 MIN: CPT | Mod: S$GLB,,, | Performed by: NURSE PRACTITIONER

## 2019-05-28 PROCEDURE — 99999 PR PBB SHADOW E&M-EST. PATIENT-LVL III: CPT | Mod: PBBFAC,,, | Performed by: NURSE PRACTITIONER

## 2019-05-28 PROCEDURE — 99214 PR OFFICE/OUTPT VISIT, EST, LEVL IV, 30-39 MIN: ICD-10-PCS | Mod: S$GLB,,, | Performed by: NURSE PRACTITIONER

## 2019-05-28 RX ORDER — SILDENAFIL 100 MG/1
100 TABLET, FILM COATED ORAL DAILY PRN
Qty: 10 TABLET | Refills: 12 | Status: SHIPPED | OUTPATIENT
Start: 2019-05-28 | End: 2022-09-30

## 2019-05-28 NOTE — PROGRESS NOTES
Subjective:       Patient ID: Chilango Sy Jr. is a 63 y.o. male.    Chief Complaint: Prostate Cancer    Chilango Sy Jr. is a 63 y.o. Male with history of Prostate Cancer; s/p RALP with Dr. Angel on 07/17/2017  Luis 7 (3+4); (-) SV, margins, extension; pT2a, pN0, pMx.  He was last seen in clinic 11/29/2018.    He is here today for 6 month f/u and survivorship clinic.  He reports ok with urination; occasional dribble with laughing but not significant; no pads.  He has one cup of coffee in the AM.    He still unable to achieve an erection for penetration.  He did not try the Revatio.  Did not like the idea of taking 5 pills.                       PSA                  <0.01               05/27/2019                 PSA                  <0.01               11/27/2018                 PSA                  <0.01               06/06/2018                 PSA                  <0.01               12/06/2017                 PSA                  <0.01               08/24/2017                  He has local PCP; 2015 was last colonoscopy.            Past Medical History:  No date: Prostate cancer    Past Surgical History:  No date: HERNIA REPAIR  No date: PROSTATE SURGERY  1961: TUMOR REMOVAL  7/17/2017: XI ROBOT ASSISTED LAPAROSCOPIC LYMPH NODE DISSECTION;   Bilateral      Comment:  [facility]  7/17/2017: XI ROBOTIC ASSISTED LAPAROSCOPIC PROSTATECTOMY; N/A      Comment:  Performed by Luis Angel MD at [facility] OR [facility]    Review of patient's family history indicates:  Problem: Cancer      Relation: Father          Age of Onset: (Not Specified)  Problem: Heart disease      Relation: Father          Age of Onset: (Not Specified)      Social History    Socioeconomic History      Marital status:       Spouse name: Not on file      Number of children: Not on file      Years of education: Not on file      Highest education level: Not on file    Occupational  History      Not on file    Social Needs      Financial resource strain: Not on file      Food insecurity:        Worry: Not on file        Inability: Not on file      Transportation needs:        Medical: Not on file        Non-medical: Not on file    Tobacco Use      Smoking status: Former Smoker        Packs/day: 1.00        Years: 6.00        Pack years: 6      Smokeless tobacco: Former User        Types: Chew    Substance and Sexual Activity      Alcohol use: Yes      Drug use: No      Sexual activity: Yes        Partners: Female    Lifestyle      Physical activity:        Days per week: Not on file        Minutes per session: Not on file      Stress: Not on file    Relationships      Social connections:        Talks on phone: Not on file        Gets together: Not on file        Attends Ten[employer] service: Not on file        Active member of club or organization: Not on file        Attends meetings of clubs or organizations: Not on file        Relationship status: Not on file    Other Topics      Concerns:        Not on file    Social History Narrative      Not on file      Allergies:  Patient has no known allergies.    Medications:  Current Outpatient Medications:     sildenafil, antihypertensive, (REVATIO) 20 mg Tab, Take 5 po 1 hour before sexual activity, Disp: 50 tablet, Rfl: 11                Review of Systems   Constitutional: Negative for activity change, appetite change, chills and fever.   HENT: Negative for facial swelling and trouble swallowing.    Eyes: Negative for visual disturbance.   Respiratory: Negative for chest tightness and shortness of breath.    Cardiovascular: Negative for chest pain and palpitations.   Gastrointestinal: Negative.  Negative for abdominal pain, constipation, diarrhea, nausea and vomiting.   Genitourinary: Positive for nocturia. Negative for difficulty urinating, dysuria, flank pain, hematuria, penile pain, penile swelling, scrotal swelling and testicular pain.         FOS is good.  nocturia 0-1x   Minimal MURRAY     Musculoskeletal: Negative for back pain, gait problem, myalgias and neck stiffness.   Skin: Negative for rash.   Neurological: Negative for dizziness and speech difficulty.   Hematological: Does not bruise/bleed easily.   Psychiatric/Behavioral: Negative for behavioral problems.       Objective:      Physical Exam   Nursing note and vitals reviewed.  Constitutional: He is oriented to person, place, and time. Vital signs are normal. He appears well-developed and well-nourished. He is active and cooperative.  Non-toxic appearance. He does not have a sickly appearance.   Urine was clear of infection.     HENT:   Head: Normocephalic and atraumatic.   Right Ear: External ear normal.   Left Ear: External ear normal.   Nose: Nose normal.   Mouth/Throat: Mucous membranes are normal.   Eyes: Conjunctivae and lids are normal. No scleral icterus.   Neck: Trachea normal, normal range of motion and full passive range of motion without pain. Neck supple. No JVD present. No tracheal deviation present.   Cardiovascular: Normal rate, S1 normal and S2 normal.    Pulmonary/Chest: Effort normal. No respiratory distress. He exhibits no tenderness.   Abdominal: Soft. Normal appearance and bowel sounds are normal. There is no hepatosplenomegaly. There is no tenderness. There is no CVA tenderness.   Genitourinary: Testes normal and penis normal. Right testis shows no mass, no swelling and no tenderness. Left testis shows no mass, no swelling and no tenderness. Circumcised. No hypospadias, penile erythema or penile tenderness. No discharge found.       Musculoskeletal: Normal range of motion.   Lymphadenopathy: No inguinal adenopathy noted on the right or left side.   Neurological: He is alert and oriented to person, place, and time. He has normal strength.   Skin: Skin is warm, dry and intact.     Psychiatric: He has a normal mood and affect. His behavior is normal. Judgment and thought content  normal.       Assessment:       1. Prostate cancer    2. History of robot-assisted laparoscopic radical prostatectomy    3. Erectile dysfunction following radical prostatectomy    4. Stress incontinence, male        Plan:         I spent 25 minutes with the patient of which more than half was spent in direct consultation with the patient in regards to our treatment and plan.    Education and recommendations of today's plan of care including home remedies.  We discussed his PSA and monitoring; every 6 months for 1st 5 years then annually for another 5; can do here or local PCP.  We discussed his MURRAY; continue the kegels; monitor bladder irritants. Diet and exercise.  We discussed ED and contributory factors;  1st, 2nd, 3rd line therapies discussed; educational materials on ICI therapy given  Rx for generic Viagra 100mg since he did not like idea of take 5 tabs of Revatio.  L-Arginine 1000mg daily  RTC 6 months with PSA.

## 2019-05-28 NOTE — PATIENT INSTRUCTIONS
L-Arginine 1000mg daily             PSA                  <0.01               05/27/2019                 PSA                  <0.01               11/27/2018                 PSA                  <0.01               06/06/2018                 PSA                  <0.01               12/06/2017                 PSA                  <0.01               08/24/2017                  What Is Prostate Cancer?    Cancer is when cells in the body change and grow out of control. Cancer cells can form lumps of tissue called tumors. Cancer that starts in the prostate is called prostate cancer. It can grow and spread beyond the prostate. Cancer that spreads is harder to treat.  Understanding the prostate  The prostate is a gland in men about the size and shape of a walnut. It surrounds the upper part of the urethra. This is the tube that carries urine from the bladder. The prostate makes some of the fluid thats part of semen. During orgasm, semen leaves the body through the urethra.  When prostate cancer forms  As a man ages, the cells of his prostate may change to form tumors or other growths. The types of growths include:  · Noncancerous growths. As a man ages, the prostate may grow larger. This is called benign prostatic hyperplasia (BPH). With BPH, extra prostate tissue often squeezes the urethra, causing symptoms such as trouble urinating. But BPH is not cancer and does not lead to cancer.  · Atypical cells. Sometimes prostate cells dont look like normal (typical) prostate cells. One type of abnormal growth is called prostatic intraepithelial neoplasia or PIN. Although PIN cells are not cancer cells, they may be a sign that cancer is likely to form.  · Cancer. When abnormal prostate cells grow out of control and start to invade other tissues, they are called cancer cells. These cells may or may not lead to symptoms. Some tumors can be felt during a physical exam, and some cant. Prostate cancer may grow into nearby  organs or spread to nearby lymph nodes. Lymph nodes are small organs around the body that are part of the immune system. In some cases, the cancer spreads to bones or organs in distant parts of the body. This is called metastasis.  Diagnosing prostate cancer  Prostate cancer may not cause symptoms at first. Urinary problems are often not a sign of cancer, but of another condition, such as BPH. To find out if you have prostate cancer, your healthcare provider must examine you and order tests. The tests help confirm a diagnosis of cancer. They also help give more information about a cancerous tumor. Tests might include:  · Prostate specific antigen (PSA) testing. PSA is a chemical made by prostate tissue. The amount of PSA in the blood (PSA level) is tested to check a mans risk for prostate cancer. In general, a high or rising PSA level may mean an increased cancer risk. A PSA test by itself cannot show if a man has prostate cancer. PSA testing is also used to check the success of cancer treatments.  · Core needle biopsy. This test is done to determine if a man has prostate cancer. A hollow needle is used to take small pieces of tissue from the prostate. This helps give more information about the cells. Before the test, pain medicine may be given to prevent pain. During the test, a small probe is inserted into the rectum. The probe sends an image of the prostate to a video monitor. With this image as a guide, the healthcare provider uses a thin, hollow needle to remove tiny tissue samples from the prostate. These are sent to a lab where they are looked at for cancer cells.  Date Last Reviewed: 5/1/2017  © 6817-7979 [employer]. [address] [place] [address], [place] [address]. All rights reserved. This information is not intended as a substitute for professional medical care. Always follow your healthcare professional's instructions.        Understanding Erectile Dysfunction    Erectile dysfunction (ED) is a  problem getting an erection firm enough or keeping it long enough for intercourse. The problem can happen to any man at any age. But health problems that can lead to ED become more common as a man ages. Up to half of men over age 40 experience ED at some point.  Causes of ED  ED can have many causes. Most are physical. Some are emotional issues. Often, a combination of causes is involved. Causes of ED may include:  · Medical conditions such as diabetes or depression  · Smoking tobacco or marijuana  · Drinking too much alcohol  · Side effects of medications  · Injury to nerves or blood vessels  · Emotional issues such as stress or relationship problems  ED can be treated  Prescription medications for ED are available. They help many men who try them. Depending upon the cause of the ED, though, medications may not be enough. In these cases, other treatment options are available. These include erectile aids and surgery. Your health care provider can tell you more about the treatment that is right for you. And new treatments for ED are being studied. No matter what the treatment you decide on, stay in touch with your doctor. If your symptoms persist, he or she may be able to adjust your current treatment or try something new.  Date Last Reviewed: 1/1/2017  © 5798-2157 [employer]. [address] [place] [address]. All rights reserved. This information is not intended as a substitute for professional medical care. Always follow your healthcare professional's instructions.        Penile Self-Injection: Notes and Precautions     Man and healthcare provider sitting across from one another, talking.   Penile self-injection is a simple technique that may improve your sex life. Some men even find that self-injection leads to an increase in natural erections. If you have questions or concerns about self-injection or erectile dysfunction (ED), talk with your healthcare provider. The information on this  sheet will help you get the best results.  Notes about penile self-injection  · You may feel a mild burning during injection. This is OK. But if you feel pressure or severe pain, stop the injection. There may be a problem with the injection site.  · Only inject the medicine on the side of your penis. It may not work if injected elsewhere.  · To prevent scarring, inject in a different spot each time.  · Dont use this treatment if you have a bleeding disorder or any risk of infection.  · Get medical help right away if your erection lasts longer than 3 to 4 hours.  Work with your healthcare provider  Ask how often you can safely repeat injections, as well as any other questions you have. You and your healthcare provider will talk about follow-up exams and how to get supplies. If the medicine doesnt work or stops working over time, tell your healthcare provider.     When to call your healthcare provider  Call your healthcare provider right away if any of these occur:  · An erection that lasts longer than 3 to 4  hours  · Bleeding or bruising  · Severe pain  · Scarring or curvature of the penis   Date Last Reviewed: 1/1/2017 © 2000-2017 [employer]. [address] [place] [address]. All rights reserved. This information is not intended as a substitute for professional medical care. Always follow your healthcare professional's instructions.        Penile Self-Injection Procedure  Self-injection is a good option if you have erectile dysfunction (ED). You insert a tiny needle into your penis and inject a medicine. This helps your penis get hard and stay that way long enough for sex. And sex and orgasm will feel as good as always. You may be nervous about doing self-injection at first. But with practice, it will get easier. Your healthcare provider will show you how to do self-injection the first time.  Talk to your doctor about any medicines you take and any medical problems you have.       Preparing for injection  · Wash your hands well with soap and water.  · Prepare the medicine (if needed).  · Sit or  a comfortable position in a warm, well-lit room. If you need to, sit or  front of a mirror.  · Find an injection site on one side of your penis, in a place with no visible veins. (Dont inject into the top, bottom, or head of the penis.)  · Clean the injection site with an alcohol swab. Grasp the head of your penis firmly with your thumb and forefinger (dont just pinch the skin). Stretch the penis so the skin on the shaft is taut.  Injecting the medicine  · Rest your penis against your inner thigh and pull it gently toward your knee. Dont twist or rotate it. This way youll be sure to inject the medicine into the spot you chose and cleaned before.  · Hold the syringe between your thumb and fingers, like youre holding a pen. Rest your forearm on your thigh for support.  · Insert the needle at a 90° angle (perpendicular) to the shaft. Do this quickly to reduce discomfort. (The needle should go in easily. If it doesnt, stop right away.)  · Move your thumb to the plunger. Press down to inject the medicine, counting to 5.  · Remove the needle and dispose of it safely.  Gaining an erection  · Apply pressure to the injection site for a few minutes. This prevents swelling and bruising and helps spread the medicine.   · Stand up. This may help your erection develop. Foreplay often helps, too.  · Your penis should become firm within 10 to 20 minutes. The erection will last long enough for sex, and maybe longer.  When to seek medical care  An erection that lasts longer than 3 to 4  hours  · Bleeding or bruising  · Severe pain  · Scarring or curvature of the penis   Date Last Reviewed: 1/7/2017  © 9472-1816 [employer]. [address] [place] [address], [place] [address]. All rights reserved. This information is not intended as a substitute for professional medical care. Always follow  your healthcare professional's instructions.

## 2019-11-22 ENCOUNTER — LAB VISIT (OUTPATIENT)
Dept: LAB | Facility: HOSPITAL | Age: 64
End: 2019-11-22
Attending: NURSE PRACTITIONER
Payer: MEDICARE

## 2019-11-22 DIAGNOSIS — Z90.79 HISTORY OF ROBOT-ASSISTED LAPAROSCOPIC RADICAL PROSTATECTOMY: ICD-10-CM

## 2019-11-22 DIAGNOSIS — C61 PROSTATE CANCER: ICD-10-CM

## 2019-11-22 LAB — COMPLEXED PSA SERPL-MCNC: <0.01 NG/ML (ref 0–4)

## 2019-11-22 PROCEDURE — 36415 COLL VENOUS BLD VENIPUNCTURE: CPT

## 2019-11-22 PROCEDURE — 84153 ASSAY OF PSA TOTAL: CPT

## 2019-11-26 ENCOUNTER — OFFICE VISIT (OUTPATIENT)
Dept: UROLOGY | Facility: CLINIC | Age: 64
End: 2019-11-26
Payer: MEDICARE

## 2019-11-26 VITALS
SYSTOLIC BLOOD PRESSURE: 137 MMHG | WEIGHT: 180.31 LBS | DIASTOLIC BLOOD PRESSURE: 81 MMHG | HEIGHT: 68 IN | BODY MASS INDEX: 27.33 KG/M2 | HEART RATE: 60 BPM

## 2019-11-26 DIAGNOSIS — Z90.79 HISTORY OF ROBOT-ASSISTED LAPAROSCOPIC RADICAL PROSTATECTOMY: ICD-10-CM

## 2019-11-26 DIAGNOSIS — N52.31 ERECTILE DYSFUNCTION FOLLOWING RADICAL PROSTATECTOMY: ICD-10-CM

## 2019-11-26 DIAGNOSIS — C61 PROSTATE CANCER: Primary | ICD-10-CM

## 2019-11-26 PROCEDURE — 99999 PR PBB SHADOW E&M-EST. PATIENT-LVL III: CPT | Mod: PBBFAC,,, | Performed by: NURSE PRACTITIONER

## 2019-11-26 PROCEDURE — 1101F PT FALLS ASSESS-DOCD LE1/YR: CPT | Mod: CPTII,S$GLB,, | Performed by: NURSE PRACTITIONER

## 2019-11-26 PROCEDURE — 99999 PR PBB SHADOW E&M-EST. PATIENT-LVL III: ICD-10-PCS | Mod: PBBFAC,,, | Performed by: NURSE PRACTITIONER

## 2019-11-26 PROCEDURE — 1101F PR PT FALLS ASSESS DOC 0-1 FALLS W/OUT INJ PAST YR: ICD-10-PCS | Mod: CPTII,S$GLB,, | Performed by: NURSE PRACTITIONER

## 2019-11-26 PROCEDURE — 3008F PR BODY MASS INDEX (BMI) DOCUMENTED: ICD-10-PCS | Mod: CPTII,S$GLB,, | Performed by: NURSE PRACTITIONER

## 2019-11-26 PROCEDURE — 99214 OFFICE O/P EST MOD 30 MIN: CPT | Mod: S$GLB,,, | Performed by: NURSE PRACTITIONER

## 2019-11-26 PROCEDURE — 99214 PR OFFICE/OUTPT VISIT, EST, LEVL IV, 30-39 MIN: ICD-10-PCS | Mod: S$GLB,,, | Performed by: NURSE PRACTITIONER

## 2019-11-26 PROCEDURE — 3008F BODY MASS INDEX DOCD: CPT | Mod: CPTII,S$GLB,, | Performed by: NURSE PRACTITIONER

## 2019-11-26 NOTE — PROGRESS NOTES
Subjective:       Patient ID: Chilango Sy Jr. is a 64 y.o. male.    Chief Complaint: Prostate Cancer (6mo f/u)    Chilango Sy Jr. is a 64 y.o. Male with history of Prostate Cancer; s/p RALP with Dr. Angel on 07/17/2017  Luis 7 (3+4); (-) SV, margins, extension; pT2a, pN0, pMx.  He was last seen in clinic 05/28/2019 for f/u [facility].    He is here today for 6 month f/u.  He reports improvement with urination; very minimal dribble; wakes up dry and able to hold it.   He has one cup of coffee in the AM.    He still unable to achieve an erection for penetration.  He tried Sildenafil 100mg once and nothing happened.   He ok for now; willing to give more time.                          PSA                  <0.01               11/22/2019                 PSA                  <0.01               05/27/2019                 PSA                  <0.01               11/27/2018                 PSA                  <0.01               06/06/2018                 PSA                  <0.01               12/06/2017                 PSA                  <0.01               08/24/2017                  He has local PCP; 2015 was last colonoscopy.            Past Medical History:  No date: Prostate cancer    Past Surgical History:  No date: HERNIA REPAIR  No date: PROSTATE SURGERY  1961: TUMOR REMOVAL  7/17/2017: XI ROBOT ASSISTED LAPAROSCOPIC LYMPH NODE DISSECTION;   Bilateral      Comment:  Performed by Luis Angel MD at [facility] OR 2ND FLR  7/17/2017: XI ROBOTIC ASSISTED LAPAROSCOPIC PROSTATECTOMY; N/A      Comment:  Performed by Luis Angel MD at [facility] OR [address] [place] [address]    Review of patient's family history indicates:  Problem: Cancer      Relation: Father          Age of Onset: (Not Specified)  Problem: Heart disease      Relation: Father          Age of Onset: (Not Specified)      Social History    Socioeconomic History      Marital status:       Spouse name: Not on file      Number of children:  Not on file      Years of education: Not on file      Highest education level: Not on file    Occupational History      Not on file    Social Needs      Financial resource strain: Not on file      Food insecurity:        Worry: Not on file        Inability: Not on file      Transportation needs:        Medical: Not on file        Non-medical: Not on file    Tobacco Use      Smoking status: Former Smoker        Packs/day: 1.00        Years: 6.00        Pack years: 6      Smokeless tobacco: Former User        Types: Chew    Substance and Sexual Activity      Alcohol use: Yes      Drug use: No      Sexual activity: Yes        Partners: Female    Lifestyle      Physical activity:        Days per week: Not on file        Minutes per session: Not on file      Stress: Not on file    Relationships      Social connections:        Talks on phone: Not on file        Gets together: Not on file        Attends [employer] service: Not on file        Active member of club or organization: Not on file        Attends meetings of clubs or organizations: Not on file        Relationship status: Not on file    Other Topics      Concerns:        Not on file    Social History Narrative      Not on file      Allergies:  Patient has no known allergies.    Medications:  Current Outpatient Medications:     sildenafil, antihypertensive, (REVATIO) 20 mg Tab, Take 5 po 1 hour before sexual activity, Disp: 50 tablet, Rfl: 11                Review of Systems   Constitutional: Negative for activity change, appetite change, chills and fever.   HENT: Negative for facial swelling and trouble swallowing.    Eyes: Negative for visual disturbance.   Respiratory: Negative for chest tightness and shortness of breath.    Cardiovascular: Negative for chest pain and palpitations.   Gastrointestinal: Negative.  Negative for abdominal pain, constipation, diarrhea, nausea and vomiting.   Genitourinary: Negative for difficulty urinating, dysuria, flank pain,  hematuria, penile pain, penile swelling, scrotal swelling and testicular pain.        Pleased with how he urinates.     Musculoskeletal: Negative for back pain, gait problem, myalgias and neck stiffness.   Skin: Negative for rash.   Neurological: Negative for dizziness and speech difficulty.   Hematological: Does not bruise/bleed easily.   Psychiatric/Behavioral: Negative for behavioral problems.       Objective:      Physical Exam   Nursing note and vitals reviewed.  Constitutional: He is oriented to person, place, and time. Vital signs are normal. He appears well-developed and well-nourished. He is active and cooperative.  Non-toxic appearance. He does not have a sickly appearance.   HENT:   Head: Normocephalic and atraumatic.   Right Ear: External ear normal.   Left Ear: External ear normal.   Nose: Nose normal.   Mouth/Throat: Mucous membranes are normal.   Eyes: Conjunctivae and lids are normal. No scleral icterus.   Neck: Trachea normal, normal range of motion and full passive range of motion without pain. Neck supple. No JVD present. No tracheal deviation present.   Cardiovascular: Normal rate, S1 normal and S2 normal.    Pulmonary/Chest: Effort normal. No respiratory distress. He exhibits no tenderness.   Abdominal: Soft. Normal appearance. There is no hepatosplenomegaly. There is no tenderness. There is no CVA tenderness.   Genitourinary: Testes normal and penis normal.       Musculoskeletal: Normal range of motion.   Neurological: He is alert and oriented to person, place, and time. He has normal strength.   Skin: Skin is warm, dry and intact.     Psychiatric: He has a normal mood and affect. His behavior is normal. Judgment and thought content normal.       Assessment:       1. Prostate cancer    2. History of robot-assisted laparoscopic radical prostatectomy    3. Erectile dysfunction following radical prostatectomy        Plan:         I spent 25 minutes with the patient of which more than half was  spent in direct consultation with the patient in regards to our treatment and plan.    Education and recommendations of today's plan of care including home remedies.  We discussed his PSA and expectations.   Discussed checking every 6 months for 1st 5 years; then annually up to 10 years after initial surgery.  He can check his PSA with local Urologist or PCP if prefers.  We discussed his ED and the contributory factors. Time frame from RALP  We discussed 2nd line therapy if interested; educational materials given.  RTC 6 months with PSA; sooner for ED or have local provider check PSA and let us know the results.  Reminder will be sent.

## 2019-11-26 NOTE — PATIENT INSTRUCTIONS
PSA                  <0.01               11/22/2019                 PSA                  <0.01               05/27/2019                 PSA                  <0.01               11/27/2018                 PSA                  <0.01               06/06/2018                 PSA                  <0.01               12/06/2017                 PSA                  <0.01               08/24/2017                  What Is Prostate Cancer?    Cancer is when cells in the body change and grow out of control. Cancer cells can form lumps of tissue called tumors. Cancer that starts in the prostate is called prostate cancer. It can grow and spread beyond the prostate. Cancer that spreads is harder to treat.  Understanding the prostate  The prostate is a gland in men about the size and shape of a walnut. It surrounds the upper part of the urethra. This is the tube that carries urine from the bladder. The prostate makes some of the fluid thats part of semen. During orgasm, semen leaves the body through the urethra.  When prostate cancer forms  As a man ages, the cells of his prostate may change to form tumors or other growths. The types of growths include:  · Noncancerous growths. As a man ages, the prostate may grow larger. This is called benign prostatic hyperplasia (BPH). With BPH, extra prostate tissue often squeezes the urethra, causing symptoms such as trouble urinating. But BPH is not cancer and does not lead to cancer.  · Atypical cells. Sometimes prostate cells dont look like normal (typical) prostate cells. One type of abnormal growth is called prostatic intraepithelial neoplasia or PIN. Although PIN cells are not cancer cells, they may be a sign that cancer is likely to form.  · Cancer. When abnormal prostate cells grow out of control and start to invade other tissues, they are called cancer cells. These cells may or may not lead to symptoms. Some tumors can be felt during a physical exam, and  some cant. Prostate cancer may grow into nearby organs or spread to nearby lymph nodes. Lymph nodes are small organs around the body that are part of the immune system. In some cases, the cancer spreads to bones or organs in distant parts of the body. This is called metastasis.  Diagnosing prostate cancer  Prostate cancer may not cause symptoms at first. Urinary problems are often not a sign of cancer, but of another condition, such as BPH. To find out if you have prostate cancer, your healthcare provider must examine you and order tests. The tests help confirm a diagnosis of cancer. They also help give more information about a cancerous tumor. Tests might include:  · Prostate specific antigen (PSA) testing. PSA is a chemical made by prostate tissue. The amount of PSA in the blood (PSA level) is tested to check a mans risk for prostate cancer. In general, a high or rising PSA level may mean an increased cancer risk. A PSA test by itself cannot show if a man has prostate cancer. PSA testing is also used to check the success of cancer treatments.  · Core needle biopsy. This test is done to determine if a man has prostate cancer. A hollow needle is used to take small pieces of tissue from the prostate. This helps give more information about the cells. Before the test, pain medicine may be given to prevent pain. During the test, a small probe is inserted into the rectum. The probe sends an image of the prostate to a video monitor. With this image as a guide, the healthcare provider uses a thin, hollow needle to remove tiny tissue samples from the prostate. These are sent to a lab where they are looked at for cancer cells.  Date Last Reviewed: 5/1/2017 © 2000-2017 [employer]. [address] [place] [address], [place] [address]. All rights reserved. This information is not intended as a substitute for professional medical care. Always follow your healthcare professional's instructions.        Penile  Self-Injection: Notes and Precautions     Man and healthcare provider sitting across from one another, talking.   Penile self-injection is a simple technique that may improve your sex life. Some men even find that self-injection leads to an increase in natural erections. If you have questions or concerns about self-injection or erectile dysfunction (ED), talk with your healthcare provider. The information on this sheet will help you get the best results.  Notes about penile self-injection  · You may feel a mild burning during injection. This is OK. But if you feel pressure or severe pain, stop the injection. There may be a problem with the injection site.  · Only inject the medicine on the side of your penis. It may not work if injected elsewhere.  · To prevent scarring, inject in a different spot each time.  · Dont use this treatment if you have a bleeding disorder or any risk of infection.  · Get medical help right away if your erection lasts longer than 3 to 4 hours.  Work with your healthcare provider  Ask how often you can safely repeat injections, as well as any other questions you have. You and your healthcare provider will talk about follow-up exams and how to get supplies. If the medicine doesnt work or stops working over time, tell your healthcare provider.     When to call your healthcare provider  Call your healthcare provider right away if any of these occur:  · An erection that lasts longer than 3 to 4  hours  · Bleeding or bruising  · Severe pain  · Scarring or curvature of the penis   Date Last Reviewed: 1/1/2017 © 2000-2017 [employer]. [address] [place] [address]. All rights reserved. This information is not intended as a substitute for professional medical care. Always follow your healthcare professional's instructions.        Penile Self-Injection Procedure  Self-injection is a good option if you have erectile dysfunction (ED). You insert a tiny needle into your  penis and inject a medicine. This helps your penis get hard and stay that way long enough for sex. And sex and orgasm will feel as good as always. You may be nervous about doing self-injection at first. But with practice, it will get easier. Your healthcare provider will show you how to do self-injection the first time.  Talk to your doctor about any medicines you take and any medical problems you have.      Preparing for injection  · Wash your hands well with soap and water.  · Prepare the medicine (if needed).  · Sit or  a comfortable position in a warm, well-lit room. If you need to, sit or  front of a mirror.  · Find an injection site on one side of your penis, in a place with no visible veins. (Dont inject into the top, bottom, or head of the penis.)  · Clean the injection site with an alcohol swab. Grasp the head of your penis firmly with your thumb and forefinger (dont just pinch the skin). Stretch the penis so the skin on the shaft is taut.  Injecting the medicine  · Rest your penis against your inner thigh and pull it gently toward your knee. Dont twist or rotate it. This way youll be sure to inject the medicine into the spot you chose and cleaned before.  · Hold the syringe between your thumb and fingers, like youre holding a pen. Rest your forearm on your thigh for support.  · Insert the needle at a 90° angle (perpendicular) to the shaft. Do this quickly to reduce discomfort. (The needle should go in easily. If it doesnt, stop right away.)  · Move your thumb to the plunger. Press down to inject the medicine, counting to 5.  · Remove the needle and dispose of it safely.  Gaining an erection  · Apply pressure to the injection site for a few minutes. This prevents swelling and bruising and helps spread the medicine.   · Stand up. This may help your erection develop. Foreplay often helps, too.  · Your penis should become firm within 10 to 20 minutes. The erection will last long enough  for sex, and maybe longer.  When to seek medical care  An erection that lasts longer than 3 to 4  hours  · Bleeding or bruising  · Severe pain  · Scarring or curvature of the penis   Date Last Reviewed: 1/7/2017 © 2000-2017 [employer]. [address] [place] [address], [place] [address]. All rights reserved. This information is not intended as a substitute for professional medical care. Always follow your healthcare professional's instructions.

## 2020-06-11 ENCOUNTER — OFFICE VISIT (OUTPATIENT)
Dept: UROLOGY | Facility: CLINIC | Age: 65
End: 2020-06-11
Payer: MEDICARE

## 2020-06-11 DIAGNOSIS — C61 PROSTATE CANCER: Primary | ICD-10-CM

## 2020-06-11 PROCEDURE — 99499 UNLISTED E&M SERVICE: CPT | Mod: 95,,, | Performed by: NURSE PRACTITIONER

## 2020-06-11 PROCEDURE — 99499 NO LOS: ICD-10-PCS | Mod: 95,,, | Performed by: NURSE PRACTITIONER

## 2020-06-11 NOTE — PROGRESS NOTES
Established Patient - Audio Only Telehealth Visit     The patient location is: ***  The chief complaint leading to consultation is: ***  Visit type: Virtual visit with audio only (telephone)  Total time spent with patient: ***       The reason for the audio only service rather than synchronous audio and video virtual visit was related to technical difficulties or patient preference/necessity.     Each patient to whom I provide medical services by telemedicine is:  (1) informed of the relationship between the physician and patient and the respective role of any other health care provider with respect to management of the patient; and (2) notified that they may decline to receive medical services by telemedicine and may withdraw from such care at any time. Patient verbally consented to receive this service via voice-only telephone call.       HPI: ***  Chilango Sy Jr. is a 64 y.o. Male with history of Prostate Cancer; s/p RALP with Dr. Angel on 07/17/2017  [place] 7 (3+4); (-) SV, margins, extension; pT2a, pN0, pMx.     Assessment and plan:  ***                        This service was not originating from a related E/M service provided within the previous 7 days nor will  to an E/M service or procedure within the next 24 hours or my soonest available appointment.  Prevailing standard of care was able to be met in this audio-only visit.

## 2020-06-15 ENCOUNTER — LAB VISIT (OUTPATIENT)
Dept: LAB | Facility: HOSPITAL | Age: 65
End: 2020-06-15
Attending: NURSE PRACTITIONER
Payer: MEDICARE

## 2020-06-15 DIAGNOSIS — Z90.79 HISTORY OF ROBOT-ASSISTED LAPAROSCOPIC RADICAL PROSTATECTOMY: ICD-10-CM

## 2020-06-15 DIAGNOSIS — C61 PROSTATE CANCER: ICD-10-CM

## 2020-06-15 LAB — COMPLEXED PSA SERPL-MCNC: <0.01 NG/ML (ref 0–4)

## 2020-06-15 PROCEDURE — 84153 ASSAY OF PSA TOTAL: CPT

## 2020-06-15 PROCEDURE — 36415 COLL VENOUS BLD VENIPUNCTURE: CPT

## 2020-06-16 DIAGNOSIS — Z90.79 HISTORY OF ROBOT-ASSISTED LAPAROSCOPIC RADICAL PROSTATECTOMY: ICD-10-CM

## 2020-06-16 DIAGNOSIS — C61 PROSTATE CANCER: Primary | ICD-10-CM

## 2020-12-14 ENCOUNTER — LAB VISIT (OUTPATIENT)
Dept: LAB | Facility: HOSPITAL | Age: 65
End: 2020-12-14
Attending: NURSE PRACTITIONER
Payer: MEDICARE

## 2020-12-14 DIAGNOSIS — Z90.79 HISTORY OF ROBOT-ASSISTED LAPAROSCOPIC RADICAL PROSTATECTOMY: ICD-10-CM

## 2020-12-14 DIAGNOSIS — C61 PROSTATE CANCER: ICD-10-CM

## 2020-12-14 LAB — COMPLEXED PSA SERPL-MCNC: <0.01 NG/ML (ref 0–4)

## 2020-12-14 PROCEDURE — 36415 COLL VENOUS BLD VENIPUNCTURE: CPT

## 2020-12-14 PROCEDURE — 84153 ASSAY OF PSA TOTAL: CPT

## 2021-07-14 DIAGNOSIS — C61 PROSTATE CANCER: Primary | ICD-10-CM

## 2021-07-15 ENCOUNTER — LAB VISIT (OUTPATIENT)
Dept: LAB | Facility: HOSPITAL | Age: 66
End: 2021-07-15
Attending: NURSE PRACTITIONER
Payer: MEDICARE

## 2021-07-15 DIAGNOSIS — C61 PROSTATE CANCER: ICD-10-CM

## 2021-07-15 LAB — COMPLEXED PSA SERPL-MCNC: <0.01 NG/ML (ref 0–4)

## 2021-07-15 PROCEDURE — 36415 COLL VENOUS BLD VENIPUNCTURE: CPT | Performed by: NURSE PRACTITIONER

## 2021-07-15 PROCEDURE — 84153 ASSAY OF PSA TOTAL: CPT | Performed by: NURSE PRACTITIONER

## 2021-07-22 ENCOUNTER — TELEPHONE (OUTPATIENT)
Dept: UROLOGY | Facility: CLINIC | Age: 66
End: 2021-07-22

## 2022-03-08 NOTE — TRANSFER OF CARE
"Anesthesia Transfer of Care Note    Patient: Chilango Sy    Procedure(s) Performed: Procedure(s) (LRB):  XI ROBOTIC ASSISTED LAPAROSCOPIC PROSTATECTOMY (N/A)  XI ROBOT ASSISTED LAPAROSCOPIC LYMPH NODE DISSECTION (Bilateral)    Patient location: PACU    Anesthesia Type: general    Transport from OR: Transported from OR on 6-10 L/min O2 by face mask with adequate spontaneous ventilation    Post pain: adequate analgesia    Post assessment: no apparent anesthetic complications    Post vital signs: stable    Level of consciousness: sedated    Nausea/Vomiting: no nausea/vomiting    Complications: none    Transfer of care protocol was followed      Last vitals:   Visit Vitals  BP (!) 106/59   Pulse 79   Temp 35.6 °C (96 °F) (Axillary)   Resp 14   Ht 5' 8" (1.727 m)   Wt 90.7 kg (200 lb)   SpO2 100%   BMI 30.41 kg/m²     " stable.

## 2022-03-31 DIAGNOSIS — C61 PROSTATE CANCER: Primary | ICD-10-CM

## 2022-04-01 ENCOUNTER — LAB VISIT (OUTPATIENT)
Dept: LAB | Facility: HOSPITAL | Age: 67
End: 2022-04-01
Attending: NURSE PRACTITIONER
Payer: MEDICARE

## 2022-04-01 DIAGNOSIS — C61 PROSTATE CANCER: ICD-10-CM

## 2022-04-01 LAB — COMPLEXED PSA SERPL-MCNC: <0.01 NG/ML (ref 0–4)

## 2022-04-01 PROCEDURE — 84153 ASSAY OF PSA TOTAL: CPT | Performed by: NURSE PRACTITIONER

## 2022-04-01 PROCEDURE — 36415 COLL VENOUS BLD VENIPUNCTURE: CPT | Performed by: NURSE PRACTITIONER

## 2022-04-06 ENCOUNTER — OFFICE VISIT (OUTPATIENT)
Dept: UROLOGY | Facility: CLINIC | Age: 67
End: 2022-04-06
Payer: MEDICARE

## 2022-04-06 DIAGNOSIS — C61 PROSTATE CANCER: Primary | ICD-10-CM

## 2022-04-06 DIAGNOSIS — N52.31 ERECTILE DYSFUNCTION FOLLOWING RADICAL PROSTATECTOMY: ICD-10-CM

## 2022-04-06 DIAGNOSIS — Z90.79 HISTORY OF ROBOT-ASSISTED LAPAROSCOPIC RADICAL PROSTATECTOMY: ICD-10-CM

## 2022-04-06 PROCEDURE — 1159F MED LIST DOCD IN RCRD: CPT | Mod: CPTII,95,, | Performed by: NURSE PRACTITIONER

## 2022-04-06 PROCEDURE — 1160F PR REVIEW ALL MEDS BY PRESCRIBER/CLIN PHARMACIST DOCUMENTED: ICD-10-PCS | Mod: CPTII,95,, | Performed by: NURSE PRACTITIONER

## 2022-04-06 PROCEDURE — 99442 PR PHYSICIAN TELEPHONE EVALUATION 11-20 MIN: CPT | Mod: 95,,, | Performed by: NURSE PRACTITIONER

## 2022-04-06 PROCEDURE — 99442 PR PHYSICIAN TELEPHONE EVALUATION 11-20 MIN: ICD-10-PCS | Mod: 95,,, | Performed by: NURSE PRACTITIONER

## 2022-04-06 PROCEDURE — 1159F PR MEDICATION LIST DOCUMENTED IN MEDICAL RECORD: ICD-10-PCS | Mod: CPTII,95,, | Performed by: NURSE PRACTITIONER

## 2022-04-06 PROCEDURE — 1160F RVW MEDS BY RX/DR IN RCRD: CPT | Mod: CPTII,95,, | Performed by: NURSE PRACTITIONER

## 2022-04-06 NOTE — PROGRESS NOTES
Established Patient - Audio Only Telehealth Visit     The patient location is: [place]  The chief complaint leading to consultation is: Prostate Cancer  Visit type: Virtual visit with audio only (telephone)  Total time spent with patient: 15 minutes.       The reason for the audio only service rather than synchronous audio and video virtual visit was related to technical difficulties or patient preference/necessity.     Each patient to whom I provide medical services by telemedicine is:  (1) informed of the relationship between the physician and patient and the respective role of any other health care provider with respect to management of the patient; and (2) notified that they may decline to receive medical services by telemedicine and may withdraw from such care at any time. Patient verbally consented to receive this service via voice-only telephone call.       HPI:   Chilango Sy Jr. is a  66 y.o. Male with history of Prostate Cancer; s/p RALP with Dr. Angel on 07/17/2017  [place] 7 (3+4); (-) SV, margins, extension; pT2a, pN0, pMx.  He was last seen in clinic 06/10/2020.     He is here today for Prostate Cancer; s/p RALP.  He is ok with urination; coffee in the AM.  No abdominal pain/bone pain.  Last PSA was <0.01 on 04/01/2022     He still unable to achieve an erection for penetration.  Oral agents are not effective.   He is not interested in ICI or IPP.  He does express interest in MUSE.  Had questions regarding Testosterone.      Assessment and plan:       We discussed his post RALP expectations. PSA check in 6 months then we go to annual. Reassurance given  We discussed his ED and the contributory factors; options available.   Reviewed MUSE; benefits, expectations, risks; has to be done in office due risks of HTN; I definitely recommend ICI over JEIMY  Discussed Testosterone; expectations risks; especially with a history of prostate cancer; we can always check, but if not having issues with low  libido/fatigue, he would not really benefit.   He going to think about MUSE.  Will send him information.                   This service was not originating from a related E/M service provided within the previous 7 days nor will  to an E/M service or procedure within the next 24 hours or my soonest available appointment.  Prevailing standard of care was able to be met in this audio-only visit.

## 2022-10-06 PROBLEM — I25.118 CORONARY ARTERY DISEASE OF NATIVE ARTERY OF NATIVE HEART WITH STABLE ANGINA PECTORIS: Status: ACTIVE | Noted: 2022-10-06

## 2022-10-06 PROBLEM — R93.1 AGATSTON CORONARY ARTERY CALCIUM SCORE GREATER THAN 400: Status: ACTIVE | Noted: 2022-10-06

## 2022-10-06 PROBLEM — E78.5 HYPERLIPIDEMIA: Status: ACTIVE | Noted: 2022-10-06

## 2022-10-06 PROBLEM — I10 HTN (HYPERTENSION): Status: ACTIVE | Noted: 2022-10-06

## 2022-10-07 ENCOUNTER — LAB VISIT (OUTPATIENT)
Dept: LAB | Facility: HOSPITAL | Age: 67
End: 2022-10-07
Attending: NURSE PRACTITIONER
Payer: MEDICARE

## 2022-10-07 DIAGNOSIS — C61 PROSTATE CANCER: ICD-10-CM

## 2022-10-07 DIAGNOSIS — N52.31 ERECTILE DYSFUNCTION FOLLOWING RADICAL PROSTATECTOMY: ICD-10-CM

## 2022-10-07 DIAGNOSIS — Z90.79 HISTORY OF ROBOT-ASSISTED LAPAROSCOPIC RADICAL PROSTATECTOMY: ICD-10-CM

## 2022-10-07 LAB — COMPLEXED PSA SERPL-MCNC: <0.01 NG/ML (ref 0–4)

## 2022-10-07 PROCEDURE — 84153 ASSAY OF PSA TOTAL: CPT | Performed by: NURSE PRACTITIONER

## 2022-10-07 PROCEDURE — 36415 COLL VENOUS BLD VENIPUNCTURE: CPT | Performed by: NURSE PRACTITIONER

## 2022-10-07 PROCEDURE — 84403 ASSAY OF TOTAL TESTOSTERONE: CPT | Performed by: NURSE PRACTITIONER

## 2022-10-08 LAB — TESTOST SERPL-MCNC: 338 NG/DL (ref 304–1227)

## 2022-10-08 NOTE — ANESTHESIA PREPROCEDURE EVALUATION
07/16/2017  Pre-operative evaluation for Procedure(s) (LRB):  XI ROBOTIC ASSISTED LAPAROSCOPIC PROSTATECTOMY (N/A)    Chilango Sy is a 61 y.o. male with PMH of smoking, obesity, adenocarcinoma, presenting for above procedure.  He was found to have a benign fatty tumor between his stomach and pancreas that was biopsied laparoscopically.  Elevated PSA, TRUS revealed prostate adenocarcinoma.    LDA:   n/a    Prev airway:   No prior records    Drips:   n/a    Patient Active Problem List   Diagnosis    Prostate cancer       Review of patient's allergies indicates:  No Known Allergies    Past Surgical History:   Procedure Laterality Date    HERNIA REPAIR      TUMOR REMOVAL  1961       Social History     Social History    Marital status:      Spouse name: N/A    Number of children: N/A    Years of education: N/A     Occupational History    Not on file.     Social History Main Topics    Smoking status: Former Smoker    Smokeless tobacco: Not on file    Alcohol use Yes    Drug use: No    Sexual activity: Not on file     Other Topics Concern    Not on file     Social History Narrative    No narrative on file       Medications:  No current facility-administered medications on file prior to encounter.      Current Outpatient Prescriptions on File Prior to Encounter   Medication Sig Dispense Refill    docusate sodium (COLACE) 100 MG capsule Take 100 mg by mouth 2 (two) times daily.         Vital Signs Range (Last 24H):         Labs:  CBC:   No results for input(s): WBC, RBC, HGB, HCT, PLT, MCV, MCH, MCHC in the last 72 hours.    CMP:  No results for input(s): NA, K, CL, CO2, BUN, CREATININE, GLU, MG, PHOS, CALCIUM, ALBUMIN, PROT, ALKPHOS, ALT, AST, BILITOT in the last 72 hours.    Coagulation:  No results for input(s): INR, PROTIME, APTT in the last 72 hours.    Diagnostic  Studies:  n/a    EKG:  No prior records.    2D Echo:  No prior records.      Anesthesia Evaluation    I have reviewed the Patient Summary Reports.    I have reviewed the Nursing Notes.   I have reviewed the Medications.     Review of Systems  Anesthesia Hx:  No problems with previous Anesthesia Denies Hx of Anesthetic complications  History of prior surgery of interest to airway management or planning: Denies Family Hx of Anesthesia complications.   Denies Personal Hx of Anesthesia complications.   Social:  Alcohol Use, Former Smoker Quit smoking when he was 17yo   Hematology/Oncology:  Hematology Normal      Current/Recent Cancer. Oncology Comments: prostate    EENT/Dental:EENT/Dental Normal   Cardiovascular:   Exercise tolerance: good Denies Hypertension. (HTN in past, but PCP said he doesn't need to take his meds anymore)  Denies MI.    Denies Angina.     no hyperlipidemia    Pulmonary:  Pulmonary Normal  Denies COPD.  Denies Asthma. (as a child only, no recent inhaler use)  Denies Sleep Apnea.    Renal/:  Renal/ Normal  Denies Chronic Renal Disease.     Hepatic/GI:  Hepatic/GI Normal  Denies GERD. Denies Liver Disease.    Musculoskeletal:  Musculoskeletal Normal    Neurological:  Neurology Normal  Denies CVA. Denies Seizures.    Endocrine:  Endocrine Normal Denies Diabetes. Denies Hypothyroidism.    Dermatological:  Skin Normal    Psych:  Psychiatric Normal           Physical Exam  General:  Well nourished, Obesity    Airway/Jaw/Neck:  Airway Findings: Mouth Opening: Normal Tongue: Normal  General Airway Assessment: Adult  Mallampati: III  Improves to II with phonation.  TM Distance: Normal, at least 6 cm  Jaw/Neck Findings:  Neck ROM: Normal ROM     Eyes/Ears/Nose:  EYES/EARS/NOSE FINDINGS: Normal   Dental:  Dental Findings: In tact   Chest/Lungs:  Chest/Lungs Clear    Heart/Vascular:  Heart Findings: Normal Heart murmur: negative       Mental Status:  Mental Status Findings: Normal        Anesthesia  Plan  Type of Anesthesia, risks & benefits discussed:  Anesthesia Type:  general  Patient's Preference:   Intra-op Monitoring Plan: standard ASA monitors  Intra-op Monitoring Plan Comments:   Post Op Pain Control Plan: multimodal analgesia, IV/PO Opioids PRN and per primary service following discharge from PACU  Post Op Pain Control Plan Comments:   Induction:   IV  Beta Blocker:  Patient is not currently on a Beta-Blocker (No further documentation required).       Informed Consent: Patient understands risks and agrees with Anesthesia plan.  Questions answered. Anesthesia consent signed with patient.  ASA Score: 2     Day of Surgery Review of History & Physical:    H&P update referred to the surgeon.         Ready For Surgery From Anesthesia Perspective.        No

## 2022-10-10 ENCOUNTER — PATIENT MESSAGE (OUTPATIENT)
Dept: UROLOGY | Facility: CLINIC | Age: 67
End: 2022-10-10
Payer: MEDICARE

## 2022-10-10 DIAGNOSIS — C61 PROSTATE CANCER: Primary | ICD-10-CM

## 2022-10-10 DIAGNOSIS — Z90.79 HISTORY OF ROBOT-ASSISTED LAPAROSCOPIC RADICAL PROSTATECTOMY: ICD-10-CM

## 2022-10-19 ENCOUNTER — TELEPHONE (OUTPATIENT)
Dept: UROLOGY | Facility: CLINIC | Age: 67
End: 2022-10-19
Payer: MEDICARE

## 2022-10-19 NOTE — TELEPHONE ENCOUNTER
----- Message from Sheri Gilbert LPN sent at 10/18/2022  4:30 PM CDT -----  Contact: pt    ----- Message -----  From: Yanet Mcnamara  Sent: 10/18/2022   2:31 PM CDT  To: Jhnoy MALONE Staff    Pt is requesting a call back regarding results from his PSA test.     Confirmed contact below:  Contact Name:Chilango Sy  Phone Number: [phone]

## 2022-10-19 NOTE — TELEPHONE ENCOUNTER
Call placed to patient. Name and date of birth verified. Patient informed PSA results are normal and instructed to follow-up in 1 yr. Patient verbalized understanding.

## 2022-10-19 NOTE — TELEPHONE ENCOUNTER
----- Message from Sheri Gilbert LPN sent at 10/18/2022  4:30 PM CDT -----  Contact: pt    ----- Message -----  From: Yanet Mcnamara  Sent: 10/18/2022   2:31 PM CDT  To: Jhony MALONE Staff    Pt is requesting a call back regarding results from his PSA test.     Confirmed contact below:  Contact Name:Chilango Sy  Phone Number: [phone]

## 2023-04-17 ENCOUNTER — OFFICE VISIT (OUTPATIENT)
Dept: UROLOGY | Facility: CLINIC | Age: 68
End: 2023-04-17
Payer: MEDICARE

## 2023-04-17 DIAGNOSIS — Z90.79 HISTORY OF ROBOT-ASSISTED LAPAROSCOPIC RADICAL PROSTATECTOMY: ICD-10-CM

## 2023-04-17 DIAGNOSIS — C61 PROSTATE CANCER: Primary | ICD-10-CM

## 2023-04-17 DIAGNOSIS — N52.31 ERECTILE DYSFUNCTION FOLLOWING RADICAL PROSTATECTOMY: ICD-10-CM

## 2023-04-17 PROCEDURE — 99442 PR PHYSICIAN TELEPHONE EVALUATION 11-20 MIN: ICD-10-PCS | Mod: 95,,, | Performed by: NURSE PRACTITIONER

## 2023-04-17 PROCEDURE — 4010F ACE/ARB THERAPY RXD/TAKEN: CPT | Mod: CPTII,95,, | Performed by: NURSE PRACTITIONER

## 2023-04-17 PROCEDURE — 1159F MED LIST DOCD IN RCRD: CPT | Mod: CPTII,95,, | Performed by: NURSE PRACTITIONER

## 2023-04-17 PROCEDURE — 1159F PR MEDICATION LIST DOCUMENTED IN MEDICAL RECORD: ICD-10-PCS | Mod: CPTII,95,, | Performed by: NURSE PRACTITIONER

## 2023-04-17 PROCEDURE — 4010F PR ACE/ARB THEARPY RXD/TAKEN: ICD-10-PCS | Mod: CPTII,95,, | Performed by: NURSE PRACTITIONER

## 2023-04-17 PROCEDURE — 99442 PR PHYSICIAN TELEPHONE EVALUATION 11-20 MIN: CPT | Mod: 95,,, | Performed by: NURSE PRACTITIONER

## 2023-04-17 PROCEDURE — 1160F RVW MEDS BY RX/DR IN RCRD: CPT | Mod: CPTII,95,, | Performed by: NURSE PRACTITIONER

## 2023-04-17 PROCEDURE — 1160F PR REVIEW ALL MEDS BY PRESCRIBER/CLIN PHARMACIST DOCUMENTED: ICD-10-PCS | Mod: CPTII,95,, | Performed by: NURSE PRACTITIONER

## 2023-04-17 NOTE — PROGRESS NOTES
Established Patient - Audio Only Telehealth Visit     The patient location is: [place]  The chief complaint leading to consultation is: Prostate Cancer  Visit type: Virtual visit with audio only (telephone)  Total time spent with patient: 15 minutes       The reason for the audio only service rather than synchronous audio and video virtual visit was related to technical difficulties or patient preference/necessity.     Each patient to whom I provide medical services by telemedicine is:  (1) informed of the relationship between the physician and patient and the respective role of any other health care provider with respect to management of the patient; and (2) notified that they may decline to receive medical services by telemedicine and may withdraw from such care at any time. Patient verbally consented to receive this service via voice-only telephone call.       HPI:      Chilango Sy Jr. is a  66 y.o. Male with history of Prostate Cancer; s/p RALP with Dr. Angel on 07/17/2017  [place] 7 (3+4); (-) SV, margins, extension; pT2a, pN0, pMx.  He was last seen in clinic 06/10/2020.     He is here today for Prostate Cancer; s/p RALP.  He is ok with urination; coffee in the AM.  No abdominal pain/bone pain.  Last PSA was <0.01 on 10/07/2022 with Testosterone of 338.      He still unable to achieve an erection for penetration.  Oral agents are not effective.   He is not interested in ICI or IPP.  He does express interest in MUSE.  He is ok with ED; 'still thinking about tx'.      Assessment and plan:       We discussed his post RALP expectations. PSA check in 6 months then we go to annual. Reassurance given  We discussed his ED and the contributory factors; options available.   Reviewed MUSE; benefits, expectations, risks; has to be done in office due risks of HTN; I definitely recommend ICI over JEIMY  Discussed Testosterone; expectations risks; especially with a history of prostate cancer; we can always check, but if not  having issues with low libido/fatigue, he would not really benefit.   He going to think about MUSE.  Will send him information.     Recommended lifestyle modifications with a proper, healthy diet, good hydration but during the day. Reducing bladder irritants.   Benefits of regular exercise.  Continue with Annual PSA                       This service was not originating from a related E/M service provided within the previous 7 days nor will  to an E/M service or procedure within the next 24 hours or my soonest available appointment.  Prevailing standard of care was able to be met in this audio-only visit.

## 2023-10-04 ENCOUNTER — LAB VISIT (OUTPATIENT)
Dept: LAB | Facility: HOSPITAL | Age: 68
End: 2023-10-04
Attending: NURSE PRACTITIONER
Payer: MEDICARE

## 2023-10-04 DIAGNOSIS — N52.31 ERECTILE DYSFUNCTION FOLLOWING RADICAL PROSTATECTOMY: ICD-10-CM

## 2023-10-04 DIAGNOSIS — Z90.79 HISTORY OF ROBOT-ASSISTED LAPAROSCOPIC RADICAL PROSTATECTOMY: ICD-10-CM

## 2023-10-04 DIAGNOSIS — C61 PROSTATE CANCER: ICD-10-CM

## 2023-10-04 LAB — COMPLEXED PSA SERPL-MCNC: <0.01 NG/ML (ref 0–4)

## 2023-10-04 PROCEDURE — 84153 ASSAY OF PSA TOTAL: CPT | Performed by: NURSE PRACTITIONER

## 2023-10-04 PROCEDURE — 36415 COLL VENOUS BLD VENIPUNCTURE: CPT | Performed by: NURSE PRACTITIONER

## 2024-08-25 NOTE — TELEPHONE ENCOUNTER
i spoke with patient, who agreed to appt date and time, will get records faxed from Dr/.Nahun León.  
Vaccine status unknown

## 2024-10-02 DIAGNOSIS — C61 PROSTATE CANCER: Primary | ICD-10-CM

## 2024-10-03 ENCOUNTER — TELEPHONE (OUTPATIENT)
Dept: UROLOGY | Facility: CLINIC | Age: 69
End: 2024-10-03

## 2024-10-03 NOTE — TELEPHONE ENCOUNTER
Returned patient's call several times unable to leave message lab has bed ordered as he requested.

## 2024-10-15 ENCOUNTER — LAB VISIT (OUTPATIENT)
Dept: LAB | Facility: HOSPITAL | Age: 69
End: 2024-10-15
Attending: NURSE PRACTITIONER
Payer: MEDICARE

## 2024-10-15 ENCOUNTER — PATIENT MESSAGE (OUTPATIENT)
Dept: UROLOGY | Facility: CLINIC | Age: 69
End: 2024-10-15
Payer: MEDICARE

## 2024-10-15 DIAGNOSIS — C61 PROSTATE CANCER: ICD-10-CM

## 2024-10-15 DIAGNOSIS — C61 PROSTATE CANCER: Primary | ICD-10-CM

## 2024-10-15 DIAGNOSIS — Z90.79 HISTORY OF ROBOT-ASSISTED LAPAROSCOPIC RADICAL PROSTATECTOMY: ICD-10-CM

## 2024-10-15 LAB — COMPLEXED PSA SERPL-MCNC: <0.01 NG/ML (ref 0–4)

## 2024-10-15 PROCEDURE — 36415 COLL VENOUS BLD VENIPUNCTURE: CPT | Performed by: NURSE PRACTITIONER

## 2024-10-15 PROCEDURE — 84153 ASSAY OF PSA TOTAL: CPT | Performed by: NURSE PRACTITIONER

## 2024-10-15 NOTE — TELEPHONE ENCOUNTER
RALP in 2017.  Lab Results   Component Value Date    PSADIAG <0.01 10/15/2024     Repeat in one year

## (undated) DEVICE — SUT PDS II 0 CT-1 VIL MONO

## (undated) DEVICE — SUT MCRYL PLUS 4-0 PS2 27IN

## (undated) DEVICE — Device

## (undated) DEVICE — GOWN SURGICAL X-LARGE

## (undated) DEVICE — LEGGINGS 48X31 INCH

## (undated) DEVICE — SOL NS 1000CC

## (undated) DEVICE — SYR 50ML CATH TIP

## (undated) DEVICE — DRAPE ABDOMINAL TIBURON 14X11

## (undated) DEVICE — SCISSOR 5MMX35CM DIRECT DRIVE

## (undated) DEVICE — SOL ELECTROLUBE ANTI-STIC

## (undated) DEVICE — DRAPE COLUMN DAVINCI XI

## (undated) DEVICE — DRAIN CHANNEL ROUND 10FR

## (undated) DEVICE — SOL CLEARIFY VISUALIZATION LAP

## (undated) DEVICE — SPONGE IV DRAIN 4X4 STERILE

## (undated) DEVICE — SUT MONOCRYL 3-0 RB1

## (undated) DEVICE — EVACUATOR WOUND BULB 100CC

## (undated) DEVICE — NDL INSUF ULTRA VERESS 120MM

## (undated) DEVICE — SEAL UNIVERSAL 5MM-8MM XI

## (undated) DEVICE — CLIPPER BLADE MOD 4406 (CAREF)

## (undated) DEVICE — LUBRICANT SURGILUBE 2 OZ

## (undated) DEVICE — COVER TIP CURVED SCISSORS XI

## (undated) DEVICE — BAG TISS RETRV MONARCH 10MM

## (undated) DEVICE — KIT EVACUATOR FULL PERF 100CC

## (undated) DEVICE — COVER LIGHT HANDLE

## (undated) DEVICE — IRRIGATOR ENDOSCOPY DISP.

## (undated) DEVICE — SUT ETHILON 2-0 PSLX 30IN

## (undated) DEVICE — TROCAR ENDOPATH XCEL 5MM 7.5CM

## (undated) DEVICE — DRAPE ARM DAVINCI XI

## (undated) DEVICE — ELECTRODE REM PLYHSV RETURN 9

## (undated) DEVICE — SEE MEDLINE ITEM 146292

## (undated) DEVICE — OBTURATOR BLADELESS 8MM XI

## (undated) DEVICE — SYR 30CC LUER LOCK

## (undated) DEVICE — PORT AIRSEAL 12/120MM LPI

## (undated) DEVICE — TRAY CATH UM FOLEY SIL W 16FR

## (undated) DEVICE — TRAY FOLEY 16FR INFECTION CONT

## (undated) DEVICE — SEE MEDLINE ITEM 146372

## (undated) DEVICE — ADHESIVE DERMABOND ADVANCED

## (undated) DEVICE — CLIP HEMO-LOK MLX LARGE LF

## (undated) DEVICE — SUT ABS CLIP LAPRA-TY CTD

## (undated) DEVICE — SUT MONOCRYL 3-0 UNDYED RB1

## (undated) DEVICE — SUT CTD VICRYL CT-1 27

## (undated) DEVICE — SET TRI-LUMEN FILTERED TUBE

## (undated) DEVICE — SOL WATER STRL IRR 1000ML

## (undated) DEVICE — TRAY MINOR GEN SURG

## (undated) DEVICE — DRAPE SCOPE PILLOW WARMER